# Patient Record
Sex: MALE | Race: WHITE | NOT HISPANIC OR LATINO | Employment: OTHER | URBAN - METROPOLITAN AREA
[De-identification: names, ages, dates, MRNs, and addresses within clinical notes are randomized per-mention and may not be internally consistent; named-entity substitution may affect disease eponyms.]

---

## 2019-12-30 ENCOUNTER — TRANSFERRED RECORDS (OUTPATIENT)
Dept: HEALTH INFORMATION MANAGEMENT | Facility: CLINIC | Age: 64
End: 2019-12-30

## 2020-01-02 ENCOUNTER — TRANSFERRED RECORDS (OUTPATIENT)
Dept: HEALTH INFORMATION MANAGEMENT | Facility: CLINIC | Age: 65
End: 2020-01-02

## 2020-01-09 ENCOUNTER — TRANSFERRED RECORDS (OUTPATIENT)
Dept: HEALTH INFORMATION MANAGEMENT | Facility: CLINIC | Age: 65
End: 2020-01-09

## 2021-07-28 ENCOUNTER — TRANSFERRED RECORDS (OUTPATIENT)
Dept: HEALTH INFORMATION MANAGEMENT | Facility: CLINIC | Age: 66
End: 2021-07-28

## 2022-09-07 ENCOUNTER — TRANSFERRED RECORDS (OUTPATIENT)
Dept: HEALTH INFORMATION MANAGEMENT | Facility: CLINIC | Age: 67
End: 2022-09-07

## 2022-09-28 ENCOUNTER — TRANSFERRED RECORDS (OUTPATIENT)
Dept: HEALTH INFORMATION MANAGEMENT | Facility: CLINIC | Age: 67
End: 2022-09-28

## 2022-09-28 ENCOUNTER — MEDICAL CORRESPONDENCE (OUTPATIENT)
Dept: HEALTH INFORMATION MANAGEMENT | Facility: CLINIC | Age: 67
End: 2022-09-28

## 2022-10-04 ENCOUNTER — TRANSCRIBE ORDERS (OUTPATIENT)
Dept: OTHER | Age: 67
End: 2022-10-04

## 2022-10-04 DIAGNOSIS — M75.121 NONTRAUMATIC COMPLETE TEAR OF RIGHT ROTATOR CUFF: ICD-10-CM

## 2022-10-04 DIAGNOSIS — M75.122 NONTRAUMATIC COMPLETE TEAR OF LEFT ROTATOR CUFF: Primary | ICD-10-CM

## 2022-10-14 NOTE — TELEPHONE ENCOUNTER
Action    Action Taken Per patient, he has had 4 surgeries on RT shoulder, 1 on LFT. All repairs, but can not remember names. States he has a titanium staple in one shoulder, but not sure which. Surgeries ranges from 1985 to around 2006, he thinks. States last one was done at [facility]. They do not have any records that far back. No OP notes from any surgeries.        DIAGNOSIS: rotator cuff tear surgery previously on both shoulders   APPOINTMENT DATE: 10/27/2022   NOTES STATUS DETAILS   OFFICE NOTE from referring provider Internal 10/04/2022 [facility]     OFFICE NOTE from other specialist Care Everywhere 11/20/2019 [facility]    DISCHARGE SUMMARY from hospital N/A    DISCHARGE REPORT from the ER N/A    OPERATIVE REPORT N/A    MEDICATION LIST N/A    EMG (for Spine) N/A    IMPLANT RECORD/STICKER N/A    LABS     CBC/DIFF N/A    CULTURES N/A    INJECTIONS DONE IN RADIOLOGY N/A    MRI Received 01/09/2020 Upper extremity   CT SCAN N/A    XRAYS (IMAGES & REPORTS) Received 01/02/2020 RT shoulder      TUMOR     PATHOLOGY  Slides & report N/A      Images received via disc.

## 2022-10-25 DIAGNOSIS — M25.512 BILATERAL SHOULDER PAIN: Primary | ICD-10-CM

## 2022-10-25 DIAGNOSIS — M25.511 BILATERAL SHOULDER PAIN: Primary | ICD-10-CM

## 2022-10-26 NOTE — PROGRESS NOTES
CHIEF CONCERN: Bilateral shoulder pain    HISTORY OF PRESENT ILLNESS: Patient is a 67-year-old right-hand-dominant male in whom I am seeing today referred by the [facility].  Patient reports that his right shoulder is more symptomatic than the left.  He has had 3 injections/year for perhaps the last 4 years.  Initially his injections are working well but the last 2 injections did not provide much relief at all.  He describes that he does not have much strength and he does have pain regularly.  His pain is worse after more activity.  He and his wife describe that at times he does develop almost a cystlike formation over the top of the right shoulder.  He was seen 9/7/22 at [facility] and received B shoulder injections that date. He was then referred to this clinic.    PAST MEDICAL HISTORY: Reviewed in the EMR and with the patient.    No current outpatient medications on file.        Not on File    SOCIAL HISTORY:    Social History     Socioeconomic History     Marital status:      Spouse name: Not on file     Number of children: Not on file     Years of education: Not on file     Highest education level: Not on file   Occupational History     Not on file   Tobacco Use     Smoking status: Not on file     Smokeless tobacco: Not on file   Substance and Sexual Activity     Alcohol use: Not on file     Drug use: Not on file     Sexual activity: Not on file   Other Topics Concern     Not on file   Social History Narrative     Not on file     Social Determinants of Health     Financial Resource Strain: Not on file   Food Insecurity: Not on file   Transportation Needs: Not on file   Physical Activity: Not on file   Stress: Not on file   Social Connections: Not on file   Intimate Partner Violence: Not on file   Housing Stability: Not on file       FAMILY HISTORY: Reviewed in EMR      REVIEW OF SYSTEMS: Positive for that noted in past medical history and history of present illness and  otherwise reviewed in EMR     PHYSICAL EXAM:    Adult male in no acute distress. Articulates and communicates with normal affect.  Respirations even and unlabored  Focused upper extremity exam: Skin intact. No erythema. Sensation intact all dermatomes into the hand to light touch. EPL, FPL, and Intrinsics intact. Right shoulder active motion is FE to 150 but pure glenohumeral motion is to only 90, ER at side to 10, and IR to back pocket. Left shoulder active motion is FE to 90, ER to 25, and IR to L5.  Motion and pain limit Neer and Torres.  No focal pain on palpation over the AC joint.  Moderate pain on palpation over the long head of the biceps.     IMAGING:  Bilateral shoulder XRs today were reviewed and I agree with the Impression below:  IMPRESSION:      Right shoulder: Moderate glenohumeral osteoarthrosis with partial joint space narrowing and marginal osteophytes. Narrowing of acromiohumeral interval with superior migration of the humeral head, indicating underlying rotator cuff tearing. Mild   degenerative arthritic changes at the acromioclavicular joint. No fracture or bone lesion. Normal joint alignment.     Left shoulder: There are advanced glenohumeral osteoarthrosis with complete joint space narrowing and marginal osteophytes. Narrowing of the acromiohumeral interval with superior migration of the humeral head, indicating underlying rotator cuff tearing.   There has been prior rotator cuff repair, suture anchors remain seated in the greater tuberosity. Moderate degenerative arthritic changes at the acromioclavicular joint. No fracture or bone lesion. Normal joint alignment.    Left shoulder MRI dated 1/9/2020 was reviewed and I agree with the Impression below:      ASSESSMENT:    1.  Bilateral cuff tear arthropathy    PLAN:  I reviewed the physical exam and imaging findings with the patient. We discussed the nature of irreparable rotator cuff tears and arthritis. We discussed reasonable expectations of  reverse TSA function. I advised the patient of the lifetime 10-15# restriction and that this replacement option likely wears differently and may not last as long as standard arthroplasty options. I disclosed my own relationship with industry in this field. He expressed understanding of this discussion. He will be in touch with our office regarding scheduling for the right shoulder.  We will review plan for postop pain medications at time of surgery (Tylenol, opioids, NSAID side effects).    Marisel Moraes MD

## 2022-10-27 ENCOUNTER — OFFICE VISIT (OUTPATIENT)
Dept: ORTHOPEDICS | Facility: CLINIC | Age: 67
End: 2022-10-27
Payer: COMMERCIAL

## 2022-10-27 ENCOUNTER — ANCILLARY PROCEDURE (OUTPATIENT)
Dept: GENERAL RADIOLOGY | Facility: CLINIC | Age: 67
End: 2022-10-27
Attending: ORTHOPAEDIC SURGERY
Payer: COMMERCIAL

## 2022-10-27 ENCOUNTER — PRE VISIT (OUTPATIENT)
Dept: ORTHOPEDICS | Facility: CLINIC | Age: 67
End: 2022-10-27

## 2022-10-27 ENCOUNTER — TELEPHONE (OUTPATIENT)
Dept: ORTHOPEDICS | Facility: CLINIC | Age: 67
End: 2022-10-27

## 2022-10-27 VITALS — HEIGHT: 67 IN | BODY MASS INDEX: 33.59 KG/M2 | WEIGHT: 214 LBS

## 2022-10-27 DIAGNOSIS — M25.512 BILATERAL SHOULDER PAIN: ICD-10-CM

## 2022-10-27 DIAGNOSIS — M75.122 NONTRAUMATIC COMPLETE TEAR OF LEFT ROTATOR CUFF: ICD-10-CM

## 2022-10-27 DIAGNOSIS — M25.511 BILATERAL SHOULDER PAIN: ICD-10-CM

## 2022-10-27 DIAGNOSIS — M75.121 NONTRAUMATIC COMPLETE TEAR OF RIGHT ROTATOR CUFF: ICD-10-CM

## 2022-10-27 PROCEDURE — 99204 OFFICE O/P NEW MOD 45 MIN: CPT | Performed by: ORTHOPAEDIC SURGERY

## 2022-10-27 PROCEDURE — 73030 X-RAY EXAM OF SHOULDER: CPT | Mod: LT | Performed by: RADIOLOGY

## 2022-10-27 RX ORDER — SIMVASTATIN 20 MG
10 TABLET ORAL AT BEDTIME
COMMUNITY

## 2022-10-27 RX ORDER — FLUTICASONE PROPIONATE 50 MCG
SPRAY, SUSPENSION (ML) NASAL
Status: ON HOLD | COMMUNITY
Start: 2022-09-15 | End: 2023-01-17

## 2022-10-27 RX ORDER — LORAZEPAM 0.5 MG/1
TABLET ORAL
COMMUNITY
Start: 2021-12-16

## 2022-10-27 RX ORDER — ZOLPIDEM TARTRATE 10 MG/1
10 TABLET ORAL
COMMUNITY

## 2022-10-27 RX ORDER — PROPRANOLOL HYDROCHLORIDE 80 MG/1
CAPSULE, EXTENDED RELEASE ORAL
COMMUNITY
Start: 2022-10-25

## 2022-10-27 RX ORDER — SERTRALINE HYDROCHLORIDE 100 MG/1
100 TABLET, FILM COATED ORAL DAILY
COMMUNITY
Start: 2022-08-22

## 2022-10-27 RX ORDER — ASPIRIN 81 MG/1
81 TABLET, CHEWABLE ORAL DAILY
Status: ON HOLD | COMMUNITY
End: 2023-01-18

## 2022-10-27 ASSESSMENT — PATIENT HEALTH QUESTIONNAIRE - PHQ9: SUM OF ALL RESPONSES TO PHQ QUESTIONS 1-9: 14

## 2022-10-27 NOTE — LETTER
10/27/2022         RE: Davey Burciaga  [address] [facility] [address]        Dear Colleague,    Thank you for referring your patient, Davey Burciaga, to the [facility]. Please see a copy of my visit note below.    CHIEF CONCERN: Bilateral shoulder pain    HISTORY OF PRESENT ILLNESS: Patient is a 67-year-old right-hand-dominant male in whom I am seeing today referred by the [facility].  Patient reports that his right shoulder is more symptomatic than the left.  He has had 3 injections/year for perhaps the last 4 years.  Initially his injections are working well but the last 2 injections did not provide much relief at all.  He describes that he does not have much strength and he does have pain regularly.  His pain is worse after more activity.  He and his wife describe that at times he does develop almost a cystlike formation over the top of the right shoulder.  He was seen 9/7/22 at [facility] and received B shoulder injections that date. He was then referred to this clinic.    PAST MEDICAL HISTORY: Reviewed in the EMR and with the patient.    No current outpatient medications on file.        Not on File    SOCIAL HISTORY:    Social History     Socioeconomic History     Marital status:      Spouse name: Not on file     Number of children: Not on file     Years of education: Not on file     Highest education level: Not on file   Occupational History     Not on file   Tobacco Use     Smoking status: Not on file     Smokeless tobacco: Not on file   Substance and Sexual Activity     Alcohol use: Not on file     Drug use: Not on file     Sexual activity: Not on file   Other Topics Concern     Not on file   Social History Narrative     Not on file     Social Determinants of Health     Financial Resource Strain: Not on file   Food Insecurity: Not on file   Transportation Needs: Not on file   Physical Activity: Not on file   Stress: Not on file    Social Connections: Not on file   Intimate Partner Violence: Not on file   Housing Stability: Not on file       FAMILY HISTORY: Reviewed in EMR      REVIEW OF SYSTEMS: Positive for that noted in past medical history and history of present illness and otherwise reviewed in EMR     PHYSICAL EXAM:    Adult male in no acute distress. Articulates and communicates with normal affect.  Respirations even and unlabored  Focused upper extremity exam: Skin intact. No erythema. Sensation intact all dermatomes into the hand to light touch. EPL, FPL, and Intrinsics intact. Right shoulder active motion is FE to 150 but pure glenohumeral motion is to only 90, ER at side to 10, and IR to back pocket. Left shoulder active motion is FE to 90, ER to 25, and IR to L5.  Motion and pain limit Neer and Torres.  No focal pain on palpation over the AC joint.  Moderate pain on palpation over the long head of the biceps.     IMAGING:  Bilateral shoulder XRs today were reviewed and I agree with the Impression below:  IMPRESSION:      Right shoulder: Moderate glenohumeral osteoarthrosis with partial joint space narrowing and marginal osteophytes. Narrowing of acromiohumeral interval with superior migration of the humeral head, indicating underlying rotator cuff tearing. Mild   degenerative arthritic changes at the acromioclavicular joint. No fracture or bone lesion. Normal joint alignment.     Left shoulder: There are advanced glenohumeral osteoarthrosis with complete joint space narrowing and marginal osteophytes. Narrowing of the acromiohumeral interval with superior migration of the humeral head, indicating underlying rotator cuff tearing.   There has been prior rotator cuff repair, suture anchors remain seated in the greater tuberosity. Moderate degenerative arthritic changes at the acromioclavicular joint. No fracture or bone lesion. Normal joint alignment.    Left shoulder MRI dated 1/9/2020 was reviewed and I agree with the Impression  below:      ASSESSMENT:    1.  Bilateral cuff tear arthropathy    PLAN:  I reviewed the physical exam and imaging findings with the patient. We discussed the nature of irreparable rotator cuff tears and arthritis. We discussed reasonable expectations of reverse TSA function. I advised the patient of the lifetime 10-15# restriction and that this replacement option likely wears differently and may not last as long as standard arthroplasty options. I disclosed my own relationship with industry in this field. He expressed understanding of this discussion. He will be in touch with our office regarding scheduling for the right shoulder.  We will review plan for postop pain medications at time of surgery (Tylenol, opioids, NSAID side effects).    Marisel Moraes MD        Again, thank you for allowing me to participate in the care of your patient.        Sincerely,        Marisel Moraes MD

## 2022-10-27 NOTE — NURSING NOTE
Depression Response    Patient completed the PHQ-9 assessment for depression and scored >9? Yes  Question 9 on the PHQ-9 was positive for suicidality? No  Does patient have current mental health provider? Yes - patient is seeing GP for anxiety and depression and is taking medication for depression.     Is this a virtual visit? No    I personally notified the following: visit provider

## 2022-10-27 NOTE — TELEPHONE ENCOUNTER
Not sure if Pt needs a CT 3-4 wks prior to surgery, but checking with Dr. Moraes.     Procedure: Right reverse total shoulder arthroplasty  Facility: [facility]  Length: 150 minutes  Anesthesia: Choice, Interscalene Block  Post-op appointments needed: 2 weeks provider only, 6 weeks with provider only.  Surgery packet/instructions given to patient?  Yes     Alejandro Perez RN

## 2022-10-27 NOTE — NURSING NOTE
"Reason For Visit:   Chief Complaint   Patient presents with     Consult For     Bilateral shoulder pain       PCP: Kervin Ta  Ref: Dr. Mcknight    ?  No  Occupation Retired.  Currently working? No.  Work status?  Retired.  Date of injury: Gradual onset  Type of injury: No specific injury.  Date of surgery: 1985 - right rotator cuff repair; 1994 - left rotator cuff repair 2000 - right rotator cuff repair 2004- right rotator cuff repair; 2010 - right shoulder rotator cuff repairs;   Smoker: No  Request smoking cessation information: No    Right hand dominant    SANE score  Affected shoulder: Bilateral (Right>left)  Right shoulder SANE: 60  Left shoulder SANE: 70    Ht 1.702 m (5' 7\")   Wt 97.1 kg (214 lb)   BMI 33.52 kg/m      Margi Yin ATC  "

## 2022-10-28 NOTE — TELEPHONE ENCOUNTER
Date Scheduled: 1-17-23  Facility: [facility]  Surgeon: Dr. Moraes   Post-op appointment scheduled: 6 week post op appointment scheduled.   2 week planned to be with a local orthopedic surgeon     scheduled?: No  Surgery packet/instructions confirmed received?  Yes  Special Considerations:   Marry Phan, Surgery Scheduling Coordinator

## 2022-12-13 NOTE — TELEPHONE ENCOUNTER
Received a call from the patient asking if he is supposed to have a CT scan prior to surgery with Dr. Moraes. I told him that I wasn't sure as there weren't any orders in the chart. He asked for a call back to confirm as he doesn't want to miss something that he is supposed to do.     He also asked if he can get something for the pain as the aspirin is not working anymore for pain control. I told him that I would send a message to Dr. Moraes's team and we would be back in touch. He is asking for a return call on his cell phone.    Marry Phan, Surgery Scheduling Coordinator

## 2022-12-16 NOTE — TELEPHONE ENCOUNTER
Received a call from the patient that he needs to have oral surgery done, and the clinic is wanting to know how far in advance or after shoulder surgery is he able to have it done. They tentatively have it scheduled for 1/30/23 which is 13 days post shoulder surgery. He doesn't know if he needs to reschedule his shoulder surgery. He would like to talk with someone on Dr. Moraes's team to help him advise on what to do.    Marry Phan, Surgery Scheduling Coordinator

## 2023-01-04 ENCOUNTER — TRANSFERRED RECORDS (OUTPATIENT)
Dept: MULTI SPECIALTY CLINIC | Facility: CLINIC | Age: 68
End: 2023-01-04
Payer: COMMERCIAL

## 2023-01-04 LAB
CREATININE (EXTERNAL): 0.9 MG/DL (ref 0.66–1.25)
GFR ESTIMATED (EXTERNAL): >90 ML/MIN/1.73M2
GLUCOSE (EXTERNAL): 102 MG/DL (ref 74–106)
POTASSIUM (EXTERNAL): 4.1 MEQ/L (ref 3.5–5.1)

## 2023-01-16 ENCOUNTER — ANESTHESIA EVENT (OUTPATIENT)
Dept: SURGERY | Facility: CLINIC | Age: 68
End: 2023-01-16
Payer: COMMERCIAL

## 2023-01-16 NOTE — ANESTHESIA PREPROCEDURE EVALUATION
Anesthesia Pre-Procedure Evaluation    Patient: Davey Burciaga   MRN: [id number] : 1955        Procedure : Procedure(s):  ARTHROPLASTY, RIGHT SHOULDER, TOTAL, REVERSE          No past medical history on file.   No past surgical history on file.   No Known Allergies   Social History     Tobacco Use     Smoking status: Not on file     Smokeless tobacco: Not on file   Substance Use Topics     Alcohol use: Not on file      Wt Readings from Last 1 Encounters:   10/27/22 97.1 kg (214 lb)        Anesthesia Evaluation   Pt has had prior anesthetic.     No history of anesthetic complications       ROS/MED HX  ENT/Pulmonary:     (+) sleep apnea, mild, doesn't use CPAP,     Neurologic:  - neg neurologic ROS     Cardiovascular:     (+) --CAD (in ) ---Previous cardiac testing     METS/Exercise Tolerance: >4 METS    Hematologic:  - neg hematologic  ROS     Musculoskeletal: Comment: Nontraumatic complete tear of right rotator cuff       GI/Hepatic:  - neg GI/hepatic ROS     Renal/Genitourinary:  - neg Renal ROS     Endo:  - neg endo ROS     Psychiatric/Substance Use:     (+) psychiatric history anxiety and depression     Infectious Disease:  - neg infectious disease ROS     Malignancy:  - neg malignancy ROS     Other:  - neg other ROS          Physical Exam    Airway        Mallampati: II   TM distance: > 3 FB   Neck ROM: full   Mouth opening: > 3 cm    Respiratory Devices and Support         Dental  no notable dental history         Cardiovascular   cardiovascular exam normal          Pulmonary   pulmonary exam normal                OUTSIDE LABS:  CBC: No results found for: WBC, HGB, HCT, PLT  BMP: No results found for: NA, POTASSIUM, CHLORIDE, CO2, BUN, CR, GLC  COAGS: No results found for: PTT, INR, FIBR  POC: No results found for: BGM, HCG, HCGS  HEPATIC: No results found for: ALBUMIN, PROTTOTAL, ALT, AST, GGT, ALKPHOS, BILITOTAL, BILIDIRECT, PAUL  OTHER: No results found for: PH, LACT, A1C, BUSTER, PHOS, MAG,  LIPASE, AMYLASE, TSH, T4, T3, CRP, SED    Anesthesia Plan    ASA Status:  2   NPO Status:  NPO Appropriate    Anesthesia Type: General.     - Airway: LMA   Induction: Intravenous.   Maintenance: Balanced.   Techniques and Equipment:     - Lines/Monitors: 2nd IV     Consents    Anesthesia Plan(s) and associated risks, benefits, and realistic alternatives discussed. Questions answered and patient/representative(s) expressed understanding.     - Discussed: Risks, Benefits and Alternatives for BOTH SEDATION and the PROCEDURE were discussed     - Discussed with:  Patient      - Extended Intubation/Ventilatory Support Discussed: No.      - Patient is DNR/DNI Status: No    Use of blood products discussed: Yes.     - Discussed with: Patient.     Postoperative Care    Pain management: IV analgesics, Multi-modal analgesia, Peripheral nerve block (Single Shot).   PONV prophylaxis: Ondansetron (or other 5HT-3), Dexamethasone or Solumedrol, Background Propofol Infusion     Comments:           H&P reviewed: Unable to attach VIRTUAL H&P to encounter due to EHR limitations. Appropriate H&P reviewed. The physical exam performed by anesthesia during this surgical encounter serves as the physical portion of that virtual H&P.  Any significant changes noted within this preop evaluation.          Patricia Leonard MD

## 2023-01-17 ENCOUNTER — APPOINTMENT (OUTPATIENT)
Dept: GENERAL RADIOLOGY | Facility: CLINIC | Age: 68
End: 2023-01-17
Attending: ORTHOPAEDIC SURGERY
Payer: COMMERCIAL

## 2023-01-17 ENCOUNTER — DOCUMENTATION ONLY (OUTPATIENT)
Dept: OTHER | Facility: CLINIC | Age: 68
End: 2023-01-17
Payer: COMMERCIAL

## 2023-01-17 ENCOUNTER — HOSPITAL ENCOUNTER (OUTPATIENT)
Facility: CLINIC | Age: 68
Discharge: HOME HEALTH | End: 2023-01-18
Attending: ORTHOPAEDIC SURGERY | Admitting: ORTHOPAEDIC SURGERY
Payer: COMMERCIAL

## 2023-01-17 ENCOUNTER — ANESTHESIA (OUTPATIENT)
Dept: SURGERY | Facility: CLINIC | Age: 68
End: 2023-01-17
Payer: COMMERCIAL

## 2023-01-17 DIAGNOSIS — M19.011 ARTHROSIS OF RIGHT SHOULDER: Primary | ICD-10-CM

## 2023-01-17 PROCEDURE — 999N000141 HC STATISTIC PRE-PROCEDURE NURSING ASSESSMENT: Performed by: ORTHOPAEDIC SURGERY

## 2023-01-17 PROCEDURE — 250N000009 HC RX 250: Performed by: ORTHOPAEDIC SURGERY

## 2023-01-17 PROCEDURE — 250N000013 HC RX MED GY IP 250 OP 250 PS 637: Performed by: INTERNAL MEDICINE

## 2023-01-17 PROCEDURE — 999N000065 XR SHOULDER RIGHT PORT G/E 2 VIEWS: Mod: RT

## 2023-01-17 PROCEDURE — 250N000013 HC RX MED GY IP 250 OP 250 PS 637: Performed by: ORTHOPAEDIC SURGERY

## 2023-01-17 PROCEDURE — 250N000011 HC RX IP 250 OP 636: Performed by: NURSE ANESTHETIST, CERTIFIED REGISTERED

## 2023-01-17 PROCEDURE — 250N000011 HC RX IP 250 OP 636: Performed by: STUDENT IN AN ORGANIZED HEALTH CARE EDUCATION/TRAINING PROGRAM

## 2023-01-17 PROCEDURE — 250N000011 HC RX IP 250 OP 636: Performed by: ORTHOPAEDIC SURGERY

## 2023-01-17 PROCEDURE — 710N000009 HC RECOVERY PHASE 1, LEVEL 1, PER MIN: Performed by: ORTHOPAEDIC SURGERY

## 2023-01-17 PROCEDURE — 258N000003 HC RX IP 258 OP 636: Performed by: NURSE ANESTHETIST, CERTIFIED REGISTERED

## 2023-01-17 PROCEDURE — 272N000001 HC OR GENERAL SUPPLY STERILE: Performed by: ORTHOPAEDIC SURGERY

## 2023-01-17 PROCEDURE — 250N000009 HC RX 250: Performed by: NURSE ANESTHETIST, CERTIFIED REGISTERED

## 2023-01-17 PROCEDURE — C1776 JOINT DEVICE (IMPLANTABLE): HCPCS | Performed by: ORTHOPAEDIC SURGERY

## 2023-01-17 PROCEDURE — C1713 ANCHOR/SCREW BN/BN,TIS/BN: HCPCS | Performed by: ORTHOPAEDIC SURGERY

## 2023-01-17 PROCEDURE — C9290 INJ, BUPIVACAINE LIPOSOME: HCPCS | Performed by: STUDENT IN AN ORGANIZED HEALTH CARE EDUCATION/TRAINING PROGRAM

## 2023-01-17 PROCEDURE — 370N000017 HC ANESTHESIA TECHNICAL FEE, PER MIN: Performed by: ORTHOPAEDIC SURGERY

## 2023-01-17 PROCEDURE — 278N000051 HC OR IMPLANT GENERAL: Performed by: ORTHOPAEDIC SURGERY

## 2023-01-17 PROCEDURE — 250N000011 HC RX IP 250 OP 636: Performed by: ANESTHESIOLOGY

## 2023-01-17 PROCEDURE — 250N000025 HC SEVOFLURANE, PER MIN: Performed by: ORTHOPAEDIC SURGERY

## 2023-01-17 PROCEDURE — 23472 RECONSTRUCT SHOULDER JOINT: CPT | Mod: RT | Performed by: ORTHOPAEDIC SURGERY

## 2023-01-17 PROCEDURE — 360N000077 HC SURGERY LEVEL 4, PER MIN: Performed by: ORTHOPAEDIC SURGERY

## 2023-01-17 PROCEDURE — 99204 OFFICE O/P NEW MOD 45 MIN: CPT | Performed by: INTERNAL MEDICINE

## 2023-01-17 PROCEDURE — 271N000001 HC OR GENERAL SUPPLY NON-STERILE: Performed by: ORTHOPAEDIC SURGERY

## 2023-01-17 DEVICE — IMPLANTABLE DEVICE
Type: IMPLANTABLE DEVICE | Site: SHOULDER | Status: FUNCTIONAL
Brand: COMPREHENSIVE® REVERSE SHOULDER

## 2023-01-17 DEVICE — IMPLANTABLE DEVICE
Type: IMPLANTABLE DEVICE | Site: SHOULDER | Status: FUNCTIONAL
Brand: COMPREHENSIVE® PROLONG®

## 2023-01-17 DEVICE — IMPLANTABLE DEVICE
Type: IMPLANTABLE DEVICE | Site: SHOULDER | Status: FUNCTIONAL
Brand: COMPREHENSIVE® SHOULDER SYSTEM

## 2023-01-17 DEVICE — IMP HUMERAL TRAY ZIM RVRS SHLDR STD 110031399: Type: IMPLANTABLE DEVICE | Site: SHOULDER | Status: FUNCTIONAL

## 2023-01-17 DEVICE — IMPLANTABLE DEVICE
Type: IMPLANTABLE DEVICE | Site: SHOULDER | Status: FUNCTIONAL
Brand: COMPREHENSIVE REVERSE SHOULDER

## 2023-01-17 RX ORDER — HYDROMORPHONE HYDROCHLORIDE 1 MG/ML
0.4 INJECTION, SOLUTION INTRAMUSCULAR; INTRAVENOUS; SUBCUTANEOUS EVERY 5 MIN PRN
Status: DISCONTINUED | OUTPATIENT
Start: 2023-01-17 | End: 2023-01-17 | Stop reason: HOSPADM

## 2023-01-17 RX ORDER — GLYCOPYRROLATE 0.2 MG/ML
INJECTION, SOLUTION INTRAMUSCULAR; INTRAVENOUS PRN
Status: DISCONTINUED | OUTPATIENT
Start: 2023-01-17 | End: 2023-01-17

## 2023-01-17 RX ORDER — ONDANSETRON 4 MG/1
4 TABLET, ORALLY DISINTEGRATING ORAL EVERY 6 HOURS PRN
Status: DISCONTINUED | OUTPATIENT
Start: 2023-01-17 | End: 2023-01-18 | Stop reason: HOSPADM

## 2023-01-17 RX ORDER — FLUMAZENIL 0.1 MG/ML
0.2 INJECTION, SOLUTION INTRAVENOUS
Status: DISCONTINUED | OUTPATIENT
Start: 2023-01-17 | End: 2023-01-17 | Stop reason: HOSPADM

## 2023-01-17 RX ORDER — ASPIRIN 81 MG/1
162 TABLET ORAL DAILY
Status: DISCONTINUED | OUTPATIENT
Start: 2023-01-18 | End: 2023-01-18 | Stop reason: HOSPADM

## 2023-01-17 RX ORDER — OXYCODONE HYDROCHLORIDE 10 MG/1
10 TABLET ORAL EVERY 4 HOURS PRN
Status: DISCONTINUED | OUTPATIENT
Start: 2023-01-17 | End: 2023-01-18 | Stop reason: HOSPADM

## 2023-01-17 RX ORDER — FENTANYL CITRATE 50 UG/ML
50 INJECTION, SOLUTION INTRAMUSCULAR; INTRAVENOUS EVERY 5 MIN PRN
Status: DISCONTINUED | OUTPATIENT
Start: 2023-01-17 | End: 2023-01-17 | Stop reason: HOSPADM

## 2023-01-17 RX ORDER — PROCHLORPERAZINE MALEATE 5 MG
5 TABLET ORAL EVERY 6 HOURS PRN
Status: DISCONTINUED | OUTPATIENT
Start: 2023-01-17 | End: 2023-01-18 | Stop reason: HOSPADM

## 2023-01-17 RX ORDER — IBUPROFEN 600 MG/1
600 TABLET, FILM COATED ORAL EVERY 6 HOURS PRN
Status: DISCONTINUED | OUTPATIENT
Start: 2023-01-17 | End: 2023-01-18 | Stop reason: HOSPADM

## 2023-01-17 RX ORDER — NALOXONE HYDROCHLORIDE 0.4 MG/ML
0.2 INJECTION, SOLUTION INTRAMUSCULAR; INTRAVENOUS; SUBCUTANEOUS
Status: DISCONTINUED | OUTPATIENT
Start: 2023-01-17 | End: 2023-01-18 | Stop reason: HOSPADM

## 2023-01-17 RX ORDER — SODIUM CHLORIDE, SODIUM LACTATE, POTASSIUM CHLORIDE, CALCIUM CHLORIDE 600; 310; 30; 20 MG/100ML; MG/100ML; MG/100ML; MG/100ML
INJECTION, SOLUTION INTRAVENOUS CONTINUOUS PRN
Status: DISCONTINUED | OUTPATIENT
Start: 2023-01-17 | End: 2023-01-17

## 2023-01-17 RX ORDER — NALOXONE HYDROCHLORIDE 0.4 MG/ML
0.2 INJECTION, SOLUTION INTRAMUSCULAR; INTRAVENOUS; SUBCUTANEOUS
Status: DISCONTINUED | OUTPATIENT
Start: 2023-01-17 | End: 2023-01-17

## 2023-01-17 RX ORDER — FENTANYL CITRATE 50 UG/ML
25 INJECTION, SOLUTION INTRAMUSCULAR; INTRAVENOUS
Status: DISCONTINUED | OUTPATIENT
Start: 2023-01-17 | End: 2023-01-17 | Stop reason: HOSPADM

## 2023-01-17 RX ORDER — BUPIVACAINE HYDROCHLORIDE 2.5 MG/ML
INJECTION, SOLUTION EPIDURAL; INFILTRATION; INTRACAUDAL
Status: COMPLETED | OUTPATIENT
Start: 2023-01-17 | End: 2023-01-17

## 2023-01-17 RX ORDER — HYDROMORPHONE HYDROCHLORIDE 1 MG/ML
0.2 INJECTION, SOLUTION INTRAMUSCULAR; INTRAVENOUS; SUBCUTANEOUS
Status: DISCONTINUED | OUTPATIENT
Start: 2023-01-17 | End: 2023-01-18 | Stop reason: HOSPADM

## 2023-01-17 RX ORDER — ONDANSETRON 4 MG/1
4 TABLET, ORALLY DISINTEGRATING ORAL EVERY 30 MIN PRN
Status: DISCONTINUED | OUTPATIENT
Start: 2023-01-17 | End: 2023-01-17 | Stop reason: HOSPADM

## 2023-01-17 RX ORDER — SODIUM CHLORIDE, SODIUM LACTATE, POTASSIUM CHLORIDE, CALCIUM CHLORIDE 600; 310; 30; 20 MG/100ML; MG/100ML; MG/100ML; MG/100ML
INJECTION, SOLUTION INTRAVENOUS CONTINUOUS
Status: DISCONTINUED | OUTPATIENT
Start: 2023-01-17 | End: 2023-01-17 | Stop reason: HOSPADM

## 2023-01-17 RX ORDER — BISACODYL 10 MG
10 SUPPOSITORY, RECTAL RECTAL DAILY PRN
Status: DISCONTINUED | OUTPATIENT
Start: 2023-01-17 | End: 2023-01-18 | Stop reason: HOSPADM

## 2023-01-17 RX ORDER — NALOXONE HYDROCHLORIDE 0.4 MG/ML
0.4 INJECTION, SOLUTION INTRAMUSCULAR; INTRAVENOUS; SUBCUTANEOUS
Status: DISCONTINUED | OUTPATIENT
Start: 2023-01-17 | End: 2023-01-17

## 2023-01-17 RX ORDER — NALOXONE HYDROCHLORIDE 0.4 MG/ML
0.4 INJECTION, SOLUTION INTRAMUSCULAR; INTRAVENOUS; SUBCUTANEOUS
Status: DISCONTINUED | OUTPATIENT
Start: 2023-01-17 | End: 2023-01-18 | Stop reason: HOSPADM

## 2023-01-17 RX ORDER — OXYCODONE HYDROCHLORIDE 5 MG/1
5 TABLET ORAL EVERY 4 HOURS PRN
Status: DISCONTINUED | OUTPATIENT
Start: 2023-01-17 | End: 2023-01-18 | Stop reason: HOSPADM

## 2023-01-17 RX ORDER — SERTRALINE HYDROCHLORIDE 100 MG/1
100 TABLET, FILM COATED ORAL AT BEDTIME
Status: DISCONTINUED | OUTPATIENT
Start: 2023-01-18 | End: 2023-01-18 | Stop reason: HOSPADM

## 2023-01-17 RX ORDER — FENTANYL CITRATE 50 UG/ML
25-50 INJECTION, SOLUTION INTRAMUSCULAR; INTRAVENOUS
Status: DISCONTINUED | OUTPATIENT
Start: 2023-01-17 | End: 2023-01-17 | Stop reason: HOSPADM

## 2023-01-17 RX ORDER — MEPERIDINE HYDROCHLORIDE 25 MG/ML
12.5 INJECTION INTRAMUSCULAR; INTRAVENOUS; SUBCUTANEOUS
Status: DISCONTINUED | OUTPATIENT
Start: 2023-01-17 | End: 2023-01-17 | Stop reason: HOSPADM

## 2023-01-17 RX ORDER — PROPOFOL 10 MG/ML
INJECTION, EMULSION INTRAVENOUS PRN
Status: DISCONTINUED | OUTPATIENT
Start: 2023-01-17 | End: 2023-01-17

## 2023-01-17 RX ORDER — ONDANSETRON 2 MG/ML
4 INJECTION INTRAMUSCULAR; INTRAVENOUS EVERY 6 HOURS PRN
Status: DISCONTINUED | OUTPATIENT
Start: 2023-01-17 | End: 2023-01-18 | Stop reason: HOSPADM

## 2023-01-17 RX ORDER — HYDROMORPHONE HYDROCHLORIDE 1 MG/ML
0.2 INJECTION, SOLUTION INTRAMUSCULAR; INTRAVENOUS; SUBCUTANEOUS EVERY 5 MIN PRN
Status: DISCONTINUED | OUTPATIENT
Start: 2023-01-17 | End: 2023-01-17 | Stop reason: HOSPADM

## 2023-01-17 RX ORDER — LIDOCAINE HYDROCHLORIDE 20 MG/ML
INJECTION, SOLUTION INFILTRATION; PERINEURAL PRN
Status: DISCONTINUED | OUTPATIENT
Start: 2023-01-17 | End: 2023-01-17

## 2023-01-17 RX ORDER — HYDROMORPHONE HYDROCHLORIDE 1 MG/ML
0.4 INJECTION, SOLUTION INTRAMUSCULAR; INTRAVENOUS; SUBCUTANEOUS
Status: DISCONTINUED | OUTPATIENT
Start: 2023-01-17 | End: 2023-01-18 | Stop reason: HOSPADM

## 2023-01-17 RX ORDER — AMOXICILLIN 250 MG
1 CAPSULE ORAL 2 TIMES DAILY
Status: DISCONTINUED | OUTPATIENT
Start: 2023-01-17 | End: 2023-01-18 | Stop reason: HOSPADM

## 2023-01-17 RX ORDER — EPHEDRINE SULFATE 50 MG/ML
INJECTION, SOLUTION INTRAMUSCULAR; INTRAVENOUS; SUBCUTANEOUS PRN
Status: DISCONTINUED | OUTPATIENT
Start: 2023-01-17 | End: 2023-01-17

## 2023-01-17 RX ORDER — SODIUM CHLORIDE, SODIUM LACTATE, POTASSIUM CHLORIDE, CALCIUM CHLORIDE 600; 310; 30; 20 MG/100ML; MG/100ML; MG/100ML; MG/100ML
INJECTION, SOLUTION INTRAVENOUS CONTINUOUS
Status: DISCONTINUED | OUTPATIENT
Start: 2023-01-17 | End: 2023-01-18 | Stop reason: HOSPADM

## 2023-01-17 RX ORDER — ONDANSETRON 2 MG/ML
4 INJECTION INTRAMUSCULAR; INTRAVENOUS EVERY 30 MIN PRN
Status: DISCONTINUED | OUTPATIENT
Start: 2023-01-17 | End: 2023-01-17 | Stop reason: HOSPADM

## 2023-01-17 RX ORDER — VITAMIN B COMPLEX
1 TABLET ORAL DAILY
COMMUNITY

## 2023-01-17 RX ORDER — ZOLPIDEM TARTRATE 10 MG/1
10 TABLET ORAL
Status: DISCONTINUED | OUTPATIENT
Start: 2023-01-17 | End: 2023-01-18 | Stop reason: HOSPADM

## 2023-01-17 RX ORDER — FENTANYL CITRATE 50 UG/ML
25 INJECTION, SOLUTION INTRAMUSCULAR; INTRAVENOUS EVERY 5 MIN PRN
Status: DISCONTINUED | OUTPATIENT
Start: 2023-01-17 | End: 2023-01-17 | Stop reason: HOSPADM

## 2023-01-17 RX ORDER — MAGNESIUM HYDROXIDE 1200 MG/15ML
LIQUID ORAL PRN
Status: DISCONTINUED | OUTPATIENT
Start: 2023-01-17 | End: 2023-01-17 | Stop reason: HOSPADM

## 2023-01-17 RX ORDER — ACETAMINOPHEN 325 MG/1
975 TABLET ORAL EVERY 8 HOURS
Status: DISCONTINUED | OUTPATIENT
Start: 2023-01-17 | End: 2023-01-18 | Stop reason: HOSPADM

## 2023-01-17 RX ORDER — VANCOMYCIN HYDROCHLORIDE 1 G/20ML
INJECTION, POWDER, LYOPHILIZED, FOR SOLUTION INTRAVENOUS PRN
Status: DISCONTINUED | OUTPATIENT
Start: 2023-01-17 | End: 2023-01-17 | Stop reason: HOSPADM

## 2023-01-17 RX ORDER — ACETAMINOPHEN 325 MG/1
650 TABLET ORAL EVERY 4 HOURS PRN
Status: DISCONTINUED | OUTPATIENT
Start: 2023-01-20 | End: 2023-01-18 | Stop reason: HOSPADM

## 2023-01-17 RX ORDER — DEXAMETHASONE SODIUM PHOSPHATE 4 MG/ML
INJECTION, SOLUTION INTRA-ARTICULAR; INTRALESIONAL; INTRAMUSCULAR; INTRAVENOUS; SOFT TISSUE PRN
Status: DISCONTINUED | OUTPATIENT
Start: 2023-01-17 | End: 2023-01-17

## 2023-01-17 RX ORDER — FENTANYL CITRATE 50 UG/ML
INJECTION, SOLUTION INTRAMUSCULAR; INTRAVENOUS PRN
Status: DISCONTINUED | OUTPATIENT
Start: 2023-01-17 | End: 2023-01-17

## 2023-01-17 RX ORDER — CEFAZOLIN SODIUM 2 G/100ML
2 INJECTION, SOLUTION INTRAVENOUS EVERY 8 HOURS
Status: COMPLETED | OUTPATIENT
Start: 2023-01-17 | End: 2023-01-18

## 2023-01-17 RX ORDER — SIMVASTATIN 10 MG
10 TABLET ORAL AT BEDTIME
Status: DISCONTINUED | OUTPATIENT
Start: 2023-01-17 | End: 2023-01-18 | Stop reason: HOSPADM

## 2023-01-17 RX ORDER — POLYETHYLENE GLYCOL 3350 17 G/17G
17 POWDER, FOR SOLUTION ORAL DAILY
Status: DISCONTINUED | OUTPATIENT
Start: 2023-01-18 | End: 2023-01-18 | Stop reason: HOSPADM

## 2023-01-17 RX ORDER — TRANEXAMIC ACID 650 MG/1
1950 TABLET ORAL ONCE
Status: COMPLETED | OUTPATIENT
Start: 2023-01-17 | End: 2023-01-17

## 2023-01-17 RX ORDER — CEFAZOLIN SODIUM/WATER 2 G/20 ML
2 SYRINGE (ML) INTRAVENOUS
Status: COMPLETED | OUTPATIENT
Start: 2023-01-17 | End: 2023-01-17

## 2023-01-17 RX ORDER — ONDANSETRON 2 MG/ML
INJECTION INTRAMUSCULAR; INTRAVENOUS PRN
Status: DISCONTINUED | OUTPATIENT
Start: 2023-01-17 | End: 2023-01-17

## 2023-01-17 RX ORDER — LIDOCAINE 40 MG/G
CREAM TOPICAL
Status: DISCONTINUED | OUTPATIENT
Start: 2023-01-17 | End: 2023-01-18 | Stop reason: HOSPADM

## 2023-01-17 RX ORDER — CEFAZOLIN SODIUM/WATER 2 G/20 ML
2 SYRINGE (ML) INTRAVENOUS SEE ADMIN INSTRUCTIONS
Status: DISCONTINUED | OUTPATIENT
Start: 2023-01-17 | End: 2023-01-17 | Stop reason: HOSPADM

## 2023-01-17 RX ADMIN — CEFAZOLIN SODIUM 2 G: 2 INJECTION, SOLUTION INTRAVENOUS at 19:14

## 2023-01-17 RX ADMIN — Medication 10 MG: at 12:22

## 2023-01-17 RX ADMIN — SODIUM CHLORIDE, POTASSIUM CHLORIDE, SODIUM LACTATE AND CALCIUM CHLORIDE: 600; 310; 30; 20 INJECTION, SOLUTION INTRAVENOUS at 13:27

## 2023-01-17 RX ADMIN — BUPIVACAINE HYDROCHLORIDE 10 ML: 2.5 INJECTION, SOLUTION EPIDURAL; INFILTRATION; INTRACAUDAL at 10:54

## 2023-01-17 RX ADMIN — PROPOFOL 50 MG: 10 INJECTION, EMULSION INTRAVENOUS at 11:31

## 2023-01-17 RX ADMIN — SUGAMMADEX 200 MG: 100 INJECTION, SOLUTION INTRAVENOUS at 14:13

## 2023-01-17 RX ADMIN — Medication 2 G: at 11:46

## 2023-01-17 RX ADMIN — LIDOCAINE HYDROCHLORIDE 100 MG: 20 INJECTION, SOLUTION INFILTRATION; PERINEURAL at 11:27

## 2023-01-17 RX ADMIN — PHENYLEPHRINE HYDROCHLORIDE 100 MCG: 10 INJECTION INTRAVENOUS at 11:29

## 2023-01-17 RX ADMIN — SIMVASTATIN 10 MG: 10 TABLET, FILM COATED ORAL at 22:39

## 2023-01-17 RX ADMIN — GLYCOPYRROLATE 0.2 MG: 0.2 INJECTION, SOLUTION INTRAMUSCULAR; INTRAVENOUS at 12:13

## 2023-01-17 RX ADMIN — BUPIVACAINE 10 ML: 13.3 INJECTION, SUSPENSION, LIPOSOMAL INFILTRATION at 10:54

## 2023-01-17 RX ADMIN — ACETAMINOPHEN 975 MG: 325 TABLET, FILM COATED ORAL at 15:37

## 2023-01-17 RX ADMIN — TRANEXAMIC ACID 1950 MG: 650 TABLET ORAL at 09:31

## 2023-01-17 RX ADMIN — Medication 50 MG: at 11:31

## 2023-01-17 RX ADMIN — FENTANYL CITRATE 50 MCG: 50 INJECTION, SOLUTION INTRAMUSCULAR; INTRAVENOUS at 14:09

## 2023-01-17 RX ADMIN — DEXAMETHASONE SODIUM PHOSPHATE 6 MG: 4 INJECTION, SOLUTION INTRA-ARTICULAR; INTRALESIONAL; INTRAMUSCULAR; INTRAVENOUS; SOFT TISSUE at 11:56

## 2023-01-17 RX ADMIN — PROPOFOL 80 MG: 10 INJECTION, EMULSION INTRAVENOUS at 11:29

## 2023-01-17 RX ADMIN — SODIUM CHLORIDE, POTASSIUM CHLORIDE, SODIUM LACTATE AND CALCIUM CHLORIDE: 600; 310; 30; 20 INJECTION, SOLUTION INTRAVENOUS at 11:14

## 2023-01-17 RX ADMIN — PHENYLEPHRINE HYDROCHLORIDE 0.3 MCG/KG/MIN: 10 INJECTION INTRAVENOUS at 11:45

## 2023-01-17 RX ADMIN — PROPOFOL 50 MG: 10 INJECTION, EMULSION INTRAVENOUS at 11:32

## 2023-01-17 RX ADMIN — PHENYLEPHRINE HYDROCHLORIDE 100 MCG: 10 INJECTION INTRAVENOUS at 11:40

## 2023-01-17 RX ADMIN — PHENYLEPHRINE HYDROCHLORIDE 100 MCG: 10 INJECTION INTRAVENOUS at 12:08

## 2023-01-17 RX ADMIN — PROPOFOL 50 MG: 10 INJECTION, EMULSION INTRAVENOUS at 11:35

## 2023-01-17 RX ADMIN — ONDANSETRON 4 MG: 2 INJECTION INTRAMUSCULAR; INTRAVENOUS at 13:50

## 2023-01-17 RX ADMIN — MIDAZOLAM HYDROCHLORIDE 1 MG: 1 INJECTION, SOLUTION INTRAMUSCULAR; INTRAVENOUS at 10:42

## 2023-01-17 RX ADMIN — FENTANYL CITRATE 50 MCG: 50 INJECTION INTRAMUSCULAR; INTRAVENOUS at 10:52

## 2023-01-17 RX ADMIN — OXYCODONE HYDROCHLORIDE 5 MG: 5 TABLET ORAL at 20:21

## 2023-01-17 RX ADMIN — PHENYLEPHRINE HYDROCHLORIDE 100 MCG: 10 INJECTION INTRAVENOUS at 12:05

## 2023-01-17 RX ADMIN — Medication 1 LOZENGE: at 20:21

## 2023-01-17 RX ADMIN — FENTANYL CITRATE 50 MCG: 50 INJECTION, SOLUTION INTRAMUSCULAR; INTRAVENOUS at 12:35

## 2023-01-17 RX ADMIN — MIDAZOLAM 1 MG: 1 INJECTION INTRAMUSCULAR; INTRAVENOUS at 11:18

## 2023-01-17 RX ADMIN — ACETAMINOPHEN 975 MG: 325 TABLET, FILM COATED ORAL at 22:39

## 2023-01-17 RX ADMIN — PROPOFOL 120 MG: 10 INJECTION, EMULSION INTRAVENOUS at 11:27

## 2023-01-17 RX ADMIN — PHENYLEPHRINE HYDROCHLORIDE 100 MCG: 10 INJECTION INTRAVENOUS at 11:44

## 2023-01-17 ASSESSMENT — ACTIVITIES OF DAILY LIVING (ADL)
ADLS_ACUITY_SCORE: 38
ADLS_ACUITY_SCORE: 35

## 2023-01-17 NOTE — ANESTHESIA PROCEDURE NOTES
Brachial plexus Procedure Note    Pre-Procedure   Staff -        Anesthesiologist:  Emanuel Estrada MD       Resident/Fellow: Augustin Che MD       Performed By: resident       Location: pre-op       Pre-Anesthestic Checklist: patient identified, IV checked, site marked, risks and benefits discussed, informed consent, monitors and equipment checked, pre-op evaluation, at physician/surgeon's request and post-op pain management  Timeout:       Correct Patient: Yes        Correct Procedure: Yes        Correct Site: Yes        Correct Position: Yes        Correct Laterality: Yes        Site Marked: Yes  Procedure Documentation  Procedure: Brachial plexus       Diagnosis: POST OPERATIVE PAIN       Laterality: right       Patient Position: supine       Patient Prep/Sterile Barriers: sterile gloves, mask       Skin prep: Chloraprep (interscalene approach).       Needle Type: short bevel       Needle Gauge: 21.        Needle Length (millimeters): 110        Ultrasound guided       1. Ultrasound was used to identify targeted nerve, plexus, vascular marker, or fascial plane and place a needle adjacent to it in real-time.       2. Ultrasound was used to visualize the spread of anesthetic in close proximity to the above referenced structure.       3. A permanent image is entered into the patient's record.    Assessment/Narrative         The placement was negative for: blood aspirated, painful injection and site bleeding       Paresthesias: No.       Bolus given via needle..        Secured via.        Insertion/Infusion Method: Single Shot       Complications: none       Injection made incrementally with aspirations every 5 mL.    Medication(s) Administered   Bupivacaine 0.25% PF (Infiltration) - Infiltration   10 mL - 1/17/2023 10:54:00 AM  Bupivacaine liposome (Exparel) 1.3% LA inj susp (Infiltration) - Infiltration   10 mL - 1/17/2023 10:54:00 AM    FOR [facility] ([facility] ONLY:   Pain Team Contact  "information: please page the Pain Team Via [facility]. Search \"Pain\". During daytime hours, please page the attending first. At night please page the resident first.    "

## 2023-01-17 NOTE — ANESTHESIA POSTPROCEDURE EVALUATION
Patient: Davey Burciaga    Procedure: Procedure(s):  ARTHROPLASTY, RIGHT SHOULDER, TOTAL, REVERSE       Anesthesia Type:  General    Note:  Disposition: Admission   Postop Pain Control: Uneventful            Sign Out: Well controlled pain   PONV: No   Neuro/Psych: Uneventful            Sign Out: Acceptable/Baseline neuro status   Airway/Respiratory: Uneventful            Sign Out: Acceptable/Baseline resp. status   CV/Hemodynamics: Uneventful            Sign Out: Acceptable CV status; No obvious hypovolemia; No obvious fluid overload   Other NRE: NONE   DID A NON-ROUTINE EVENT OCCUR? No           Last vitals:  Vitals Value Taken Time   /106 01/17/23 1545   Temp 37  C (98.6  F) 01/17/23 1530   Pulse 85 01/17/23 1546   Resp 27 01/17/23 1548   SpO2 98 % 01/17/23 1547   Vitals shown include unvalidated device data.    Electronically Signed By: Geri Thomas MD  January 17, 2023  4:37 PM  
WDL
Universal Safety Interventions

## 2023-01-17 NOTE — ANESTHESIA CARE TRANSFER NOTE
Patient: Davey Burciaga    Procedure: Procedure(s):  ARTHROPLASTY, RIGHT SHOULDER, TOTAL, REVERSE       Diagnosis: Nontraumatic complete tear of right rotator cuff [M75.121]  Diagnosis Additional Information: No value filed.    Anesthesia Type:   General     Note:    Oropharynx: oropharynx clear of all foreign objects  Level of Consciousness: awake  Oxygen Supplementation: face mask  Level of Supplemental Oxygen (L/min / FiO2): 8  Independent Airway: airway patency satisfactory and stable  Dentition: dentition unchanged  Vital Signs Stable: post-procedure vital signs reviewed and stable  Report to RN Given: handoff report given  Patient transferred to: PACU    Handoff Report: Identifed the Patient, Identified the Reponsible Provider, Reviewed the pertinent medical history, Discussed the surgical course, Reviewed Intra-OP anesthesia mangement and issues during anesthesia, Set expectations for post-procedure period and Allowed opportunity for questions and acknowledgement of understanding      Vitals:  Vitals Value Taken Time   BP     Temp     Pulse     Resp     SpO2         Electronically Signed By: LANETTE Davidson CRNA  January 17, 2023  2:28 PM

## 2023-01-17 NOTE — PROGRESS NOTES
PACU to Inpatient Nursing Handoff    Patient Davey Burciaga is a 67 year old male who speaks English.   Procedure Procedure(s):  ARTHROPLASTY, RIGHT SHOULDER, TOTAL, REVERSE   Surgeon(s) Primary: Marisel Moraes MD  Resident - Assisting: Rodolfo Rodriguez MD     No Known Allergies    Isolation  No active isolations     Past Medical History   has no past medical history on file.    Anesthesia General with Block   Dermatome Level     Preop Meds acetaminophen (Tylenol) - time given: 1430   Nerve block Brachial Plexus .  Location:right. Med:Exparel (liposomal bupivacaine). Time given: 1451   Intraop Meds Bupivacaine 0.25% PF (Infiltration) (mL)  Total volume:  10 mL  Date/Time Rate/Dose/Volume Action Route Admin User Audit    01/17/23 1054 10 mL Given Infiltration Augustin Che MD       Bupivacaine liposome (Exparel) 1.3% LA inj susp (Infiltration) (mL)  Total volume:  10 mL  Date/Time Rate/Dose/Volume Action Route Admin User Audit    01/17/23 1054 10 mL Given Infiltration Augustin Che MD       midazolam 1mg/mL (mg)  Total dose:  1 mg  Date/Time Rate/Dose/Volume Action Route Admin User Audit    01/17/23 1118 1 mg Given Intravenous Deb Recinos APRN CRNA       fentaNYL (SUBLIMAZE) injection (mcg)  Total dose:  100 mcg  Date/Time Rate/Dose/Volume Action Route Admin User Audit    01/17/23 1235 50 mcg Given Intravenous Deb Recinos APRN CRNA     1409 50 mcg Given Intravenous Deb Recinos APRN CRNA       lidocaine 2% (mg)  Total dose:  100 mg  Date/Time Rate/Dose/Volume Action Route Admin User Audit    01/17/23 1127 100 mg Given Intravenous Eula Reinoso APRN CRNA       propofol (DIPRIVAN) injection 10 mg/mL vial (mg)  Total dose:  350 mg  Date/Time Rate/Dose/Volume Action Route Admin User Audit    01/17/23 1127 120 mg Given Intravenous Eula Reinoso APRN CRNA     1129 80 mg Given Intravenous Eula Reinoso APRN CRNA     1131 50 mg Given Intravenous Kemar,  LANETTE Chen CRNA     1132 50 mg Given Intravenous Eula Reinoso APRN CRNA     1135 50 mg Given Intravenous Elua Reinoso APRN CRNA       rocuronium 10mg/mL (mg)  Total dose:  50 mg  Date/Time Rate/Dose/Volume Action Route Admin User Audit    01/17/23 1131 50 mg Given Intravenous Eula Reinoso APRN CRNA       dexamethasone 4mg/mL (mg)  Total dose:  6 mg  Date/Time Rate/Dose/Volume Action Route Admin User Audit    01/17/23 1156 6 mg Given Intravenous Deb Recinos APRN CRNA       ondansetron 2mg/mL (mg)  Total dose:  4 mg  Date/Time Rate/Dose/Volume Action Route Admin User Audit    01/17/23 1350 4 mg Given Intravenous Deb Recinos APRN CRNA       ePHEDrine 5 mg/mL (mg)  Total dose:  10 mg  Date/Time Rate/Dose/Volume Action Route Admin User Audit    01/17/23 1222 10 mg Given Intravenous Deb Recinos APRN CRNA       phenylephrine (JAMEE-SYNEPHRINE) injection (mcg)  Total dose:  500 mcg  Date/Time Rate/Dose/Volume Action Route Admin User Audit    01/17/23 1129 100 mcg New Bag Intravenous Eula Reinoso APRN CRNA     1140 100 mcg Bolus Intravenous Deb Recinos APRN CRNA     1144 100 mcg Bolus Intravenous Eula Reinoso APRN CRNA     1205 100 mcg Bolus Intravenous Deb Recinos APRN CRNA     1208 100 mcg Bolus Intravenous Deb Recinos APRN CRNA       glycopyrrolate 0.2mg/mL (mg)  Total dose:  0.2 mg  Date/Time Rate/Dose/Volume Action Route Admin User Audit    01/17/23 1213 0.2 mg Given Intravenous Deb Recinos APRN CRNA       sugammadex 200 mg/2ml (mg)  Total dose:  200 mg  Date/Time Rate/Dose/Volume Action Route Admin User Audit    01/17/23 1413 200 mg Given Intravenous Deb Recinos APRN CRNA       ceFAZolin Sodium (ANCEF) injection 2 g (g)  Total dose:  2 g Dosing weight:  97.1  Date/Time Rate/Dose/Volume Action Route Admin User Audit    01/17/23 1146 2 g (over 3 min) Given Intravenous Deb Recinos, LANETTE CRNA        phenylephrine 0.1 mg/mL infusion (mcg/kg/min) (mcg/kg/min)  Total dose:  10.35 mg Dosing weight:  95  Date/Time Rate/Dose/Volume Action Route Admin User Audit    01/17/23 1145 0.3 mcg/kg/min - 17.1 mL/hr New Bag Intravenous JorjeDeb wild Fortino APRN CRNA     1154 0.5 mcg/kg/min - 28.5 mL/hr Rate Change Intravenous JorjeDeb Fortino, APRN CRNA     1202 0.8 mcg/kg/min - 45.6 mL/hr Rate Change Intravenous Jorje, Deb Fortino, APRN CRNA     1232 1 mcg/kg/min - 57 mL/hr Rate Change Intravenous Jorje, Deb Smithan, APRN CRNA     1237  Stopped Intravenous Jorje, Deb Smithan, APRN CRNA     1246 1 mcg/kg/min - 57 mL/hr Restarted Intravenous Jorje, Deb Smithan, APRN CRNA     1253 0.7 mcg/kg/min - 39.9 mL/hr Rate Change Intravenous Jorje, Deb Smithan, APRN CRNA     1301 1 mcg/kg/min - 57 mL/hr Rate Change Intravenous Jorje, Deb Smithan, APRN CRNA     1313 0.9 mcg/kg/min - 51.3 mL/hr Rate Change Intravenous Jorje, Deb Smithan, APRN CRNA     1407  Stopped Intravenous Jorje, Deb Fortino, APRN CRNA       LR (mL)  Total volume:  1,200 mL  Date/Time Rate/Dose/Volume Action Route Admin User Audit    01/17/23 1114  New Bag Intravenous Eula Reinoso, APRN CRNA     1327 1,000 mL New Bag Intravenous JorjeDeb wildan, APRN CRNA     1424 200 mL Anesthesia Volume Adjustment Intravenous Jorje, Deb Freitas, APRN CRNA          Local Meds No   Antibiotics cefazolin (Ancef) - last given at 1146     Pain Patient Currently in Pain: denies   PACU meds  Not applicable   PCA / epidural No   Capnography     Telemetry ECG Rhythm: Normal sinus rhythm   Inpatient Telemetry Monitor Ordered? No        Labs Glucose No results found for: GLC    Hgb No results found for: HGB    INR No results found for: INR   PACU Imaging Not applicable     Wound/Incision Incision/Surgical Site 01/17/23 Right Shoulder (Active)   Incision Assessment UTV 01/17/23 1436   Dressing Intervention Clean, dry, intact 01/17/23 1436   Number of days: 0      CMS         Equipment ice pack   Other LDA       IV Access Peripheral IV 01/17/23 Left Hand (Active)   Site Assessment WDL 01/17/23 1436   Line Status Infusing 01/17/23 1436   Dressing Intervention New dressing  01/17/23 0939   Phlebitis Scale 0-->no symptoms 01/17/23 1436   Infiltration Scale 0 01/17/23 1436   Infiltration Site Treatment Method  None 01/17/23 0939   Number of days: 0       Peripheral IV 01/17/23 Left Foot (Active)   Site Assessment WDL 01/17/23 1436   Line Status Saline locked 01/17/23 1436   Phlebitis Scale 0-->no symptoms 01/17/23 1436   Infiltration Scale 0 01/17/23 1436   Number of days: 0      Blood Products Not applicable EBL 0 mL   Intake/Output Date 01/17/23 0700 - 01/18/23 0659   Shift 5404-2281 6538-9222 4888-1578 24 Hour Total   INTAKE   I.V. 1200   1200   Shift Total(mL/kg) 1200(12.61)   1200(12.61)   OUTPUT   Shift Total(mL/kg)       Weight (kg) 95.2 95.2 95.2 95.2      Drains / Torres     Time of void PreOp Void Prior to Procedure: 0830 (01/17/23 0911)    PostOp      Diapered? no   Bladder Scan  329 ml   PO   250 water     Vitals    B/P: 115/76  T: 98.9  F (37.2  C)    Temp src: Oral  P:  Pulse: 94 (01/17/23 1430)          R: 18  O2:  SpO2: 99 %    O2 Device: Simple face mask (01/17/23 1422)    Oxygen Delivery: 6 LPM (01/17/23 1422)         Family/support present significant other   Patient belongings     Patient transported on cart and air mat   DC meds/scripts (obs/outpt) Not applicable   Inpatient Pain Meds Released? Yes       Special needs/considerations None   Tasks needing completion None       Jesu Patel RN  ASCOM [id number]

## 2023-01-17 NOTE — ANESTHESIA PROCEDURE NOTES
Airway       Patient location during procedure: OR       Procedure Start/Stop Times: 1/17/2023 11:36 AM  Staff -        Other Anesthesia Staff: Kimym De Souza       Performed By: SRNA  Consent for Airway        Urgency: elective  Indications and Patient Condition       Indications for airway management: marlen-procedural       Induction type:intravenous       Mask difficulty assessment: 2 - vent by mask + OA or adjuvant +/- NMBA    Final Airway Details       Final airway type: endotracheal airway       Successful airway: ETT - single  Endotracheal Airway Details        ETT size (mm): 7.5       Cuffed: yes       Successful intubation technique: direct laryngoscopy       DL Blade Type: [place] 2       Grade View of Cords: 2       Adjucts: stylet       Position: Left       Measured from: gums/teeth       Secured at (cm): 22       Bite block used: None    Post intubation assessment        Placement verified by: capnometry, equal breath sounds and chest rise        Number of attempts at approach: 2       Number of other approaches attempted: 0       Secured with: pink tape       Ease of procedure: easy       Dentition: Unchanged    Medication(s) Administered   Medication Administration Time: 1/17/2023 11:36 AM

## 2023-01-17 NOTE — BRIEF OP NOTE
[facility]    Brief Operative Note    Pre-operative diagnosis: Cuff Tear Arthropathy, R shoulder  Post-operative diagnosis Same as pre-operative diagnosis    Procedure: Procedure(s):  ARTHROPLASTY, RIGHT SHOULDER, TOTAL, REVERSE  Surgeon: Surgeon(s) and Role:     * Marisel Moraes MD - Primary     * Rodolfo Rodriguez MD - Resident - Assisting  Anesthesia: General with Block   Estimated Blood Loss: 200 ml    Drains: None  Specimens: * No specimens in log *  Findings:   Cuff tear arthropathy.  Complications: None.  Implants:   Implant Name Type Inv. Item Serial No.  Lot No. LRB No. Used Action   IMP BASEPLATE RVRS SHLDR ZIM MED AUG [id number] - [id number] Total Joint Component/Insert IMP BASEPLATE RVRS SHLDR ZIM MED AUG [id number]  [employer] [id number] Right 1 Implanted   IMP SCR CENTRAL BIOMET REV SHLDR 6.5X30MM [id number] - [id number] Metallic Hardware/[place] IMP SCR CENTRAL BIOMET REV SHLDR 6.5X30MM 790224  [employer] [id number] Right 1 Implanted   IMP SCR LOCKING BIOM REV SHLDR 3.5 HEX 4.93Z65CZ 757371 - [id number] Total Joint Component/Insert IMP SCR LOCKING BIOM REV SHLDR 3.5 HEX 4.27M07ZD 644690  [employer] [id number] Right 1 Implanted   IMP SCR LOCKING BIOM REV SHLDR 3.5 HEX 4.94N98BS 608376 - [id number] Total Joint Component/Insert IMP SCR LOCKING BIOM REV SHLDR 3.5 HEX 4.79S09HT 774312  [employer] [id number] Right 1 Implanted   IMP SCR LOCKING BIOM REV SHLDR 3.5 HEX 4.17A53NU 714819 - [id number] Metallic Hardware/[place] IMP SCR LOCKING BIOM REV SHLDR 3.5 HEX 4.22L38GZ 796382  [employer] [id number] Right 1 Implanted   IMP GLENOSPHERE BIOM REV SHLDR 36MM STD 318834 - [id number] Total Joint Component/Insert IMP GLENOSPHERE BIOM REV SHLDR 36MM STD 659858  [employer] [id number] Right 1 Implanted   IMP SCR LOCKING BIOM REV SHLDR 3.5 HEX 4.43W29HG 759540 - [id number] Metallic Hardware/[place] IMP SCR LOCKING BIOM REV SHLDR 3.5 HEX 4.25G02HL  638119  [employer] [id number] Right 1 Implanted   IMP STEM PRIMARY SHLDR MICRO ZIM 15MM  001032 - [id number] Total Joint Component/Insert IMP STEM PRIMARY SHLDR MICRO ZIM 15MM  767859  [employer] [id number] Right 1 Implanted   IMP BEARING HUMERAL ZIM 36MM STD PRLNG [id number] - [id number] Total Joint Component/Insert IMP BEARING HUMERAL ZIM 36MM STD PRLNG [id number]  [employer] [id number] Right 1 Implanted   IMP HUMERAL TRAY ZIM RVRS SHLDR STD [id number] - [id number] Total Joint Component/Insert IMP HUMERAL TRAY ZIM RVRS SHLDR STD [id number]  [employer] [id number] Right 1 Implanted   IMP HUMERAL TRAY ZIM RVRS SHLDR STD [id number] - [id number] Total Joint Component/Insert IMP HUMERAL TRAY ZIM RVRS SHLDR STD [id number]  [employer] [id number] Right 1 Implanted     Removed 3 metal anchors in greater tuberosity

## 2023-01-18 ENCOUNTER — APPOINTMENT (OUTPATIENT)
Dept: OCCUPATIONAL THERAPY | Facility: CLINIC | Age: 68
End: 2023-01-18
Attending: ORTHOPAEDIC SURGERY
Payer: COMMERCIAL

## 2023-01-18 VITALS
SYSTOLIC BLOOD PRESSURE: 130 MMHG | DIASTOLIC BLOOD PRESSURE: 78 MMHG | WEIGHT: 209.88 LBS | TEMPERATURE: 97.9 F | OXYGEN SATURATION: 95 % | BODY MASS INDEX: 32.94 KG/M2 | HEART RATE: 94 BPM | RESPIRATION RATE: 18 BRPM | HEIGHT: 67 IN

## 2023-01-18 PROCEDURE — 97165 OT EVAL LOW COMPLEX 30 MIN: CPT | Mod: GO

## 2023-01-18 PROCEDURE — 97535 SELF CARE MNGMENT TRAINING: CPT | Mod: GO

## 2023-01-18 PROCEDURE — 250N000011 HC RX IP 250 OP 636: Performed by: ORTHOPAEDIC SURGERY

## 2023-01-18 PROCEDURE — 250N000013 HC RX MED GY IP 250 OP 250 PS 637: Performed by: ORTHOPAEDIC SURGERY

## 2023-01-18 PROCEDURE — 250N000013 HC RX MED GY IP 250 OP 250 PS 637: Performed by: INTERNAL MEDICINE

## 2023-01-18 PROCEDURE — 99214 OFFICE O/P EST MOD 30 MIN: CPT | Performed by: INTERNAL MEDICINE

## 2023-01-18 RX ORDER — POLYETHYLENE GLYCOL 3350 17 G/17G
17 POWDER, FOR SOLUTION ORAL DAILY
Qty: 10 PACKET | Refills: 0 | Status: SHIPPED | OUTPATIENT
Start: 2023-01-18

## 2023-01-18 RX ORDER — IBUPROFEN 600 MG/1
600 TABLET, FILM COATED ORAL EVERY 6 HOURS PRN
Qty: 40 TABLET | Refills: 0 | Status: SHIPPED | OUTPATIENT
Start: 2023-01-18

## 2023-01-18 RX ORDER — AMOXICILLIN 250 MG
1 CAPSULE ORAL 2 TIMES DAILY
Qty: 30 TABLET | Refills: 0 | Status: SHIPPED | OUTPATIENT
Start: 2023-01-18

## 2023-01-18 RX ORDER — ACETAMINOPHEN 325 MG/1
650 TABLET ORAL EVERY 4 HOURS PRN
Qty: 60 TABLET | Refills: 0 | Status: SHIPPED | OUTPATIENT
Start: 2023-01-20

## 2023-01-18 RX ORDER — OXYCODONE HYDROCHLORIDE 5 MG/1
5 TABLET ORAL EVERY 4 HOURS PRN
Qty: 26 TABLET | Refills: 0 | Status: SHIPPED | OUTPATIENT
Start: 2023-01-18

## 2023-01-18 RX ADMIN — SENNOSIDES AND DOCUSATE SODIUM 1 TABLET: 8.6; 5 TABLET ORAL at 08:58

## 2023-01-18 RX ADMIN — CEFAZOLIN SODIUM 2 G: 2 INJECTION, SOLUTION INTRAVENOUS at 04:23

## 2023-01-18 RX ADMIN — ZOLPIDEM TARTRATE 10 MG: 10 TABLET, FILM COATED ORAL at 04:29

## 2023-01-18 RX ADMIN — POLYETHYLENE GLYCOL 3350 17 G: 17 POWDER, FOR SOLUTION ORAL at 08:58

## 2023-01-18 RX ADMIN — OXYCODONE HYDROCHLORIDE 5 MG: 5 TABLET ORAL at 04:23

## 2023-01-18 RX ADMIN — OXYCODONE HYDROCHLORIDE 5 MG: 5 TABLET ORAL at 00:30

## 2023-01-18 RX ADMIN — ASPIRIN 162 MG: 81 TABLET, COATED ORAL at 08:58

## 2023-01-18 ASSESSMENT — ACTIVITIES OF DAILY LIVING (ADL)
ADLS_ACUITY_SCORE: 38
ADLS_ACUITY_SCORE: 36
ADLS_ACUITY_SCORE: 39

## 2023-01-18 NOTE — PLAN OF CARE
"Pt. discharged at 1200 to home, and left with personal belongings. Pt. received complete discharge paperwork and all medications as filled by discharge pharmacy. Pt received and signed for the narcotic medication Oxycodone. Pt. was given times of last dose for all discharge medications in writing on discharge medication sheets. Discharge teaching included all medication, pain management, activity restrictions, dressing changes, and signs and symptoms of infection. Dressing supplies sent home. Pt. to follow up with Dr. Moraes in 6 weeks. Pt. had no further questions at the time of discharge and no unmet needs were identified.    /78   Pulse 94   Temp 97.9  F (36.6  C) (Oral)   Resp 18   Ht 1.702 m (5' 7.01\")   Wt 95.2 kg (209 lb 14.1 oz)   SpO2 95%   BMI 32.86 kg/m      Patient vital signs are at baseline: Yes  Patient able to ambulate as they were prior to admission or with assist devices provided by therapies during their stay:  Yes  Patient MUST void prior to discharge:  Yes  Patient able to tolerate oral intake:  Yes  Pain has adequate pain control using Oral analgesics:  Yes  Does patient have an identified :  Yes  Has goal D/C date and time been discussed with patient:  Yes    "

## 2023-01-18 NOTE — PROGRESS NOTES
Orthopaedic Surgery Progress Note 01/18/2023    Subjective  No acute events overnight. Did not sleep well and took his home ambien at 0430, nursing asked to minimize awakenings and therefore came back later.  Pain  controlled. Denies new numbness, tingling, or weakness.  Tolerating diet without nausea or vomiting.  Voiding spontaneously.  +flatus, -BM.   Denies fevers, chills, chest pain, SOB.  Ambulating, working with OT    Objective  Temp: 97.9  F (36.6  C) Temp src: Oral BP: 119/67 Pulse: 86   Resp: 18 SpO2: 94 % O2 Device: None (Room air) Oxygen Delivery: 2 LPM    Exam:  Gen: No acute distress, resting comfortably in bed.  Resp: Non-labored breathing  Cardio: Regular rate via peripheral pulse  MSK:  RUE:   - Dressings c/d/I, small area of shallow drainage (smaller than a pencil eraser on distal end of dressing)  -Sling in place  - Fires EPL, FPL, Intrinsics  - SILT medial/radial/ulnar/axillary nerves  - Radial pulse 2+, hand wwp      Assessment: Davey Burciaga is a 67 year old male s/p reverse TSA on 1/17/2023 with Dr. Moraes. Doing well.    Plan:  Orthopedics primary  Activity: Up with assist.  Weight bearing status: Nonweightbearing right upper extremity.  Antibiotics: Ancef x24 hours perioperatively.  Diet: Begin with clear fluids and progress diet as tolerated newly.  DVT prophylaxis: ASA 162mg and mechanical while in the hospital, discharge on ASA 162mg x 4 weeks.  Bracing/Splinting: Sling to be kept clean, dry, and intact until follow-up.  Elevation: Elevate right upper extremity on pillows  Wound Care: Aquacel dressing, leave intact.  If saturated please page Ortho  Drains: None  Pain management: transition from IV to orals as tolerated.   X-rays: X-rays completed in PACU  Physical Therapy: ROM, ADL's.  Occupational Therapy: ADL's.  Labs: Trend Hgb on POD #1, 2, 3  Cultures: None  Consults: PT, OT, medicine, appreciate assistance in caring for this patient.  Follow-up: Clinic with Dr. Moraes in 2  weeks.    Disposition: Pending progress with therapies, pain control on orals, and medical stability, anticipate discharge to home on POD #1-2.    Rodolfo Rodriguez, DO  PGY-1  Orthopaedic Surgery  Pager: [phone]     Please page me directly with any questions/concerns during regular weekday hours before 5 pm. If there is no response, if it is a weekend, or if it is during evening hours then please page the orthopedic surgery resident on call.       Future Appointments   Date Time Provider Department [place]   1/18/2023  8:15 AM Taylor Diaz, OT UROT [place]   3/6/2023  3:00 PM Marisel Moraes MD [facility]

## 2023-01-18 NOTE — CONSULTS
"[facility]  Consult Note - Hospitalist Service  Date of Admission:  1/17/2023  Consult Requested by: Ortho   Reason for Consult: Medical co-management     Assessment & Plan   Davey Burciaga is a 67 year old male admitted on 1/17/2023. POD # 0 s/p ARTHROPLASTY, RIGHT SHOULDER, TOTAL, REVERSE.  ml. Mo complications during surgery.   Internal medicine consulted for medical co-management. He has a PMHx significant for Anxiety, hx of bilateral hearing loss, carotid artery stenosis, essential tremors, GERD and depression.     ARTHROPLASTY, RIGHT SHOULDER, TOTAL, REVERSE  -Ortho is primary team.   -Pain control, DVT prophylaxis and marlen-op antibiotics per primary team.   -Gentle hydration.   -PT and OT per protocol.   -Capnography per protocol.   -Monitor HGB.   -Bowel regimen.    Dyslipidemia   Hx of carotid artery stenosis   -Continue on PTA Zocor.   -Hold PTA ASA for now, restart per Ortho.     Essential tremors  -Hold PTA Inderal for now.     Depression   -Continue on PTA Zoloft.        Clinically Significant Risk Factors Present on Admission                       # Obesity: Estimated body mass index is 32.86 kg/m  as calculated from the following:    Height as of this encounter: 1.702 m (5' 7.01\").    Weight as of this encounter: 95.2 kg (209 lb 14.1 oz).           Wenceslao Moreno MD  Hospitalist Service  Securely message with [employer] (more info)  Text page via [facility] Paging/Directory   ______________________________________________________________________    Chief Complaint     Shoulder pain     History is obtained from the patient    History of Present Illness   Davey Burciaga is a 67 year old male admitted on 1/17/2023. POD # 0 s/p ARTHROPLASTY, RIGHT SHOULDER, TOTAL, REVERSE.  ml. Mo complications during surgery.   Internal medicine consulted for medical co-management. He has a PMHx significant for Anxiety, hx of bilateral hearing loss, carotid " artery stenosis, essential tremors, GERD and depression.     I saw the patient after surgery in the medical floor. Patient was pleasant, oriented and looked comfortable. Patient denies chest pain, palpitations, shortness of breath, cough, nausea, vomit, dizziness, lightheadedness, fever, chills, diaphoresis, abdominal pain or dysuria.       Past Medical History    History reviewed. No pertinent past medical history.    Past Surgical History   History reviewed. No pertinent surgical history.    Medications   Medications Prior to Admission   Medication Sig Dispense Refill Last Dose     aspirin (ASA) 81 MG chewable tablet Take 81 mg by mouth daily   Past Week     LORazepam (ATIVAN) 0.5 MG tablet TAKE 1 TO 2 TABLETS BY MOUTH TWICE DAILY AS NEEDED   Past Month     propranolol ER (INDERAL LA) 80 MG 24 hr capsule    1/16/2023 at 2100     sertraline (ZOLOFT) 100 MG tablet Take 100 mg by mouth daily   1/16/2023 at 2100     simvastatin (ZOCOR) 20 MG tablet Take 10 mg by mouth At Bedtime   1/16/2023 at 2100     Vitamin D3 (CHOLECALCIFEROL) 25 mcg (1000 units) tablet Take 1 tablet by mouth daily   Past Month     zolpidem (AMBIEN) 10 MG tablet Take 10 mg by mouth nightly as needed for sleep             Review of Systems    The 10 point Review of Systems is negative other than noted in the HPI or here. Other ROS negative.      Physical Exam   Vital Signs: Temp: 97.9  F (36.6  C) Temp src: Oral BP: 134/75 Pulse: 86   Resp: 16 SpO2: 94 % O2 Device: None (Room air) Oxygen Delivery: 2 LPM  Weight: 209 lbs 14.05 oz    General Appearance: Alert, on O 2 NC, no acute distress.   Respiratory: Normal respiratory effort, clear lungs. No crackles or wheezing.   Cardiovascular: RRR.  GI: Abdomens soft, + BS, no tenderness to palpation.   Skin: No rash.   Other: Right shoulder with dressing and sling. Alert, oriented x 3, moving all extremities.     Medical Decision Making       50 MINUTES SPENT BY ME on the date of service doing chart review,  history, exam, documentation & further activities per the note.      Data

## 2023-01-18 NOTE — PLAN OF CARE
[facility]      OUTPATIENT OCCUPATIONAL THERAPY  EVALUATION  PLAN OF TREATMENT FOR OUTPATIENT REHABILITATION  (COMPLETE FOR INITIAL CLAIMS ONLY)  Patient's Last Name, First Name, M.I.  YOB: 1955  Davey Burciaga                          Provider's Name  [facility] Medical Record No.  [id number]                               Onset Date:  01/17/23   Start of Care Date:        Type:     ___PT   _X_OT   ___SLP Medical Diagnosis:                           OT Diagnosis:  impaired IND for ADLs   Visits from SOC:  1   _________________________________________________________________________________  Plan of Treatment/Functional Goals    Planned Interventions: ADL retraining   Goals: See Occupational Therapy Goals on Care Plan in [facility] electronic health record.    Therapy Frequency: One time eval and treatment  Predicted Duration of Therapy Intervention: 01/18/23  _________________________________________________________________________________    I CERTIFY THE NEED FOR THESE SERVICES FURNISHED UNDER        THIS PLAN OF TREATMENT AND WHILE UNDER MY CARE     (Physician co-signature of this document indicates review and certification of the therapy plan).               ,      Referring Physician: Marisel Moraes MD            Initial Assessment        See Occupational Therapy evaluation dated   in Epic electronic health record.

## 2023-01-18 NOTE — PROGRESS NOTES
"[facility]    Medicine Progress Note - Hospitalist Service, GOLD TEAM 17    Date of Admission:  1/17/2023    Assessment & Plan     A: Patient is a 66 y/o man who has carotid artery stenosis, GERD, depression, anxiety and history of bilateral hearing loss. Patient underwent right shoulder total reverse arthroplasty on 17-Jan-2023 for right shoulder cuff tear arthropathy.     P:  1.) Right shoulder cuff tear arthropathy s/p right shoulder total reverse arthroplasty: Patient receiving post-operative care. Patient on scheduled acetaminophen. Patient on acetaminophen, oxycodone and ibuprofen as needed. Patient on aspirin for DVT prophylaxis.  2.) Hyperlipidemia: Patient on zocor.  3.) Carotid artery stenosis: Further monitoring as outpatient.  4.) Essential tremor: Patient to resume propranolol as outpatient.  5.) Depression: Patient on zoloft.  6.) Obstructive sleep apnea, patient not using CPAP: F/u as outpatient.       Diet: Advance Diet as Tolerated: Regular Diet Adult  Diet    Torres Catheter: Not present  Lines: None     Cardiac Monitoring: None  Code Status: Full Code      Clinically Significant Risk Factors Present on Admission                       # Obesity: Estimated body mass index is 32.86 kg/m  as calculated from the following:    Height as of this encounter: 1.702 m (5' 7.01\").    Weight as of this encounter: 95.2 kg (209 lb 14.1 oz).           Disposition Plan      Expected Discharge Date: 01/18/2023                  Champ Rock MD  Hospitalist Service, GOLD TEAM 17  [facility]  Securely message with [employer] (more info)  Text page via [employer] Paging/Directory   See signed in provider for up to date coverage information  ______________________________________________________________________    Interval History     Patient noted feeling well. Patient noted pain controlled. Patient noted no fever, no chills, no nausea " and no vomiting.     Physical Exam   Vital Signs: Temp: 97.9  F (36.6  C) Temp src: Oral BP: 130/78 Pulse: 94   Resp: 18 SpO2: 95 % O2 Device: None (Room air) Oxygen Delivery: 2 LPM  Weight: 209 lbs 14.05 oz    General: Patient comfortable, NAD.  Heart: RRR, S1 S2 w/o murmurs.  Lungs: Breath sounds present. No crackles/wheezes heard.  Abdomen: Firm, nontender.      Medical Decision Making

## 2023-01-18 NOTE — PLAN OF CARE
Pt reported around 10pm some delays in care and was not happy at that time. Remedied patient's issues, repositioned pt in bed and helped with CAPNO. Pt said an NST helped him and was unhappy with him so pt did not want that NST back. Patient doing good now with no new concerns.    Pt wanted to apologize to NST for his behavior; NST has left for the day.

## 2023-01-18 NOTE — PLAN OF CARE
Pt. Arrived to unit at 1625 from PACU. A&Ox4. VSS. Afebrile. Lung sounds CTA. Maintaining sats on RA. IS encouraged, tolerating well, achieving 1500mL. Bowel sounds active, LBM 1/17 prior to surgery. CMS and neuro's are intact. Reports numbness in RUE, tingling in R thumb, index and middle fingers that is getting better, otherwise denies. Denies nausea, shortness of breath, and chest pain. Reported very minimal pain in RUE, Exparel block, pain managed currently w/ ice pack. Voids spontaneously without difficulty in the BR, voided 400mL w/ 150mL PVR. Tolerating regular diet. RUE incisional dressing is CDI, small area of shadow drainage, smaller than pencil eraser, on distal end of dressing. Pt up with Ax1 walker and gait belt. PIV is patent and infusing in LUE, R foot PIV patent and SL. PCD's on BLE's. Call light is within reach, pt able to make needs known and is resting comfortably between cares. Will continue to monitor.

## 2023-01-18 NOTE — PLAN OF CARE
Occupational Therapy Discharge Summary    Reason for therapy discharge:    All goals and outcomes met, no further needs identified.    Progress towards therapy goal(s). See goals on Care Plan in [facility] electronic health record for goal details.  Goals met    Therapy recommendation(s):    No further therapy is recommended.  Continue home exercise program.

## 2023-01-18 NOTE — PROGRESS NOTES
01/18/23 1000   Appointment Info   Signing Clinician's Name / Credentials (OT) GUS Sanders   Student Supervision Therapy services provided with the co-signing licensed therapist guiding and directing the services, and providing the skilled judgement and assessment throughout the session   Rehab Comments (OT) (R) sling with pillow   Living Environment   People in Home spouse   Current Living Arrangements house   Home Accessibility stairs to enter home;stairs within home   Number of Stairs, Main Entrance 2   Stair Railings, Main Entrance railings safe and in good condition   Number of Stairs, Within Home, Primary seven   Stair Railings, Within Home, Primary railings safe and in good condition   Transportation Anticipated family or friend will provide   Living Environment Comments split level home, has lift chair STS   Self-Care   Usual Activity Tolerance good   Current Activity Tolerance moderate   Regular Exercise No   Equipment Currently Used at Home grab bar, tub/shower;shower chair;raised toilet seat   Fall history within last six months no   Activity/Exercise/Self-Care Comment IND at baseline   Instrumental Activities of Daily Living (IADL)   Previous Responsibilities laundry;housekeeping;meal prep;yardwork;shopping   General Information   Onset of Illness/Injury or Date of Surgery 01/17/23   Referring Physician Marisel Moraes MD   Patient/Family Therapy Goal Statement (OT) have a working shoulder   Additional Occupational Profile Info/Pertinent History of Current Problem s/p R TSA reverse   Existing Precautions/Restrictions shoulder   Limitations/Impairments hearing   Left Upper Extremity (Weight-bearing Status) full weight-bearing (FWB)   Right Upper Extremity (Weight-bearing Status) non weight-bearing (NWB)   Left Lower Extremity (Weight-bearing Status) full weight-bearing (FWB)   Right Lower Extremity (Weight-bearing Status) full weight-bearing (FWB)   Cognitive Status Examination    Cognitive Status Comments WFL   Visual Perception   Visual Impairment/Limitations corrective lenses full-time   Impact of Vision Impairment on Function (Vision) WFL   Sensory   Sensory Comments thumb and pointer finger numb,  related to sling   Pain Assessment   Patient Currently in Pain Yes, see Vital Sign flowsheet   Bed Mobility   Bed Mobility No deficits identified   Transfers   Transfers No deficits identified   Balance   Balance Assessment no deficits were identified   Balance Comments standing and walking balance WFL   Bathing Assessment/Intervention   Harper Level (Bathing) minimum assist (75% patient effort)   Upper Body Dressing Assessment/Training   Harper Level (Upper Body Dressing) minimum assist (75% patient effort)   Lower Body Dressing Assessment/Training   Harper Level (Lower Body Dressing) minimum assist (75% patient effort)   Clinical Impression   Criteria for Skilled Therapeutic Interventions Met (OT) Yes, treatment indicated   OT Diagnosis impaired IND for ADLs   OT Problem List-Impairments impacting ADL post-surgical precautions;pain;activity tolerance impaired   Assessment of Occupational Performance 1-3 Performance Deficits   Planned Therapy Interventions (OT) ADL retraining   Clinical Decision Making Complexity (OT) low complexity   Risk & Benefits of therapy have been explained evaluation/treatment results reviewed;care plan/treatment goals reviewed;risks/benefits reviewed;current/potential barriers reviewed;participants voiced agreement with care plan;participants included;patient;spouse/significant other;daughter   Clinical Impression Comments Pt experiencing diminished IND for ADLs 2/2 post-op precautions and pain. Pt would benefit from skilled OT for ADL retraining, including splint management, within precautions.   OT Total Evaluation Time   OT Eval, Low Complexity Minutes (52789) 10   OT Goals   Therapy Frequency (OT) One time eval and treatment   OT Predicted  Duration/Target Date for Goal Attainment 01/18/23   OT Goals Upper Body Dressing;Upper Body Bathing;Bed Mobility   OT: Upper Body Dressing Minimal assist;within precautions;including orthotic;Goal Met   OT: Lower Body Dressing Minimal assist;within precautions;Goal Met   OT: Upper Body Bathing Modified independent;within precautions;Goal Met   OT: Bed Mobility Modified independent;within precautions;Goal Met   Self-Care/Home Management   Self-Care/Home Mgmt/ADL, Compensatory, Meal Prep Minutes (44345) 25   Symptoms Noted During/After Treatment (Meal Preparation/Planning Training) fatigue   Treatment Detail/Skilled Intervention Pt was greeted in room, standing and organizing belongings. Pt and wife educated on precautions and sling management. Verbalized understanding on how to don/doff sling within precautions. Following education and demonstration, pt demonstrated understanding of one-handed dressing techniques with SBA. VC's used frequently to remind pt not to move shoulder. Pt educated on wrist/elbow exercises, demonstrated understanding. Pt ambulated to PT gym IND. Pt educated on hand placement & body positioning for stairs with railing on one side. Pt demonstrated understanding by completing 8 stairs with SBA. Pt ambulated back to room IND. Pt left with all needs met, standing and conversing with wife and daughter.   OT Discharge Planning   OT Plan DC   OT Discharge Recommendation (DC Rec) home with assist   OT Rationale for DC Rec Pt is up and moving in room IND. Pt has support at home from wife, has DME from wife's spinal surgery last year.   OT Brief overview of current status ambulate IND   Total Session Time   Timed Code Treatment Minutes 25   Total Session Time (sum of timed and untimed services) 35

## 2023-01-18 NOTE — PHARMACY-ADMISSION MEDICATION HISTORY
Admission Medication History Completed by Pharmacy    See [facility] Admission Navigator for allergy information, preferred outpatient pharmacy, prior to admission medications and immunization status.     Medication History Sources:     Patient, pharmacy dispense report, PDMP    Changes made to PTA medication list (reason):    Added: vitamin d     Deleted: flonase nasal spray (not using)    Changed: sertraline changed from 150 mg daily to 100 mg daily (prescribed to take up to 150 mg a day but patient has been taking 100 mg which has been working well for him). Simvastatin also changed from 20 mg per day to 10 mg per day (takes a half of a tablet, confirmed with pharmacy dispense records)    Additional Information:    Pt says said doesn't need vit d in hospital    Lorazepam very seldom per patient (likely an old  prescription he keeps on hand since no recent dispenses per PDMP)    Prior to Admission medications    Medication Sig Last Dose Taking? Auth Provider Long Term End Date   LORazepam (ATIVAN) 0.5 MG tablet TAKE 1 TO 2 TABLETS BY MOUTH TWICE DAILY AS NEEDED Past Month Yes Reported, Patient     propranolol ER (INDERAL [place]) 80 MG 24 hr capsule  1/16/2023 at 2100 Yes Reported, Patient Yes    sertraline (ZOLOFT) 100 MG tablet Take 100 mg by mouth daily 1/16/2023 at 2100 Yes Reported, Patient Yes    simvastatin (ZOCOR) 20 MG tablet Take 10 mg by mouth At Bedtime 1/16/2023 at 2100 Yes Reported, Patient Yes    Vitamin D3 (CHOLECALCIFEROL) 25 mcg (1000 units) tablet Take 1 tablet by mouth daily Past Month Yes Unknown, Entered By History     aspirin (ASA) 81 MG chewable tablet Take 81 mg by mouth daily 1/7/2023 yes Reported, Patient     zolpidem (AMBIEN) 10 MG tablet Take 10 mg by mouth nightly as needed for sleep 1/15/2023 yes Reported, Patient         Date completed: 01/17/23    Medication history completed by: Tracey Mcintyre PharmD

## 2023-01-18 NOTE — DISCHARGE SUMMARY
ORTHOPAEDIC SURGERY DISCHARGE SUMMARY     Date of Admission: 1/17/2023  Date of Discharge: 1/18/2023 12:14 PM  Disposition: Home  Staff Physician: No att. providers found  Primary Care Provider: Kervin Ta    DISCHARGE DIAGNOSIS:  Nontraumatic complete tear of right rotator cuff [M75.121]    PROCEDURES: Procedure(s):  Right reverse total shoulder arthroplasty  on 1/17/2023    BRIEF HISTORY:  This is a 67 year old patient who has been followed in clinic. Please refer to that documentation for full details. Briefly, the patient has a history of rotator cuff tear. The patient has failed conservative treatment of symptoms and desires more definitive intervention. Therefore, after reviewing non-operative and operative options including the risks and benefits associated with each, the patient elected to proceed with the above stated procedure.     HOSPITAL COURSE:    The patient was admitted following the above listed procedures for pain control and rehabilitation. Davey Burciaga did well post-operatively. Medicine was consulted post operatively to aid in management of medical co-morbidities. The patient received routine nursing cares and at the time of discharge was medically stable. Vital signs were stable throughout admission. The patient is tolerating a regular diet and is voiding spontaneously. All PT/OT goals have been met for safe mobility. Pain is now controlled on oral medications which will be available on discharge. Stool softeners have been used while taking pain medications to help prevent constipation. Davey Burciaga is deemed medically safe to discharge.     Antibiotics:  Ancef given periop and 24 hours postop.  DVT prophylaxis:  Mechanical  PT Progress:  Has met PT/OT goals for safe mobility.    Pain Meds:  Weaned off all IV pain meds by discharge.  Inpatient Events: No significant events or complications.     PHYSICAL EXAM:    Gen: No acute distress, resting comfortably in bed.  Resp:  Non-labored breathing  Cardio: Regular rate via peripheral pulse  MSK:  RUE:   - Dressings c/d/I, small area of shallow drainage (smaller than a pencil eraser on distal end of dressing)  -Sling in place  - Fires EPL, FPL, Intrinsics  - SILT medial/radial/ulnar/axillary nerves  - Radial pulse 2+, hand wwp    FOLLOWUP:    Follow up with Dr. Mcknight in [place] in 2 weeks postoperatively and Dr. Moraes at 6 weeks postoperatively.    Future Appointments   Date Time Provider Department Center   3/6/2023  3:00 PM Marisel Moraes MD [facility]       Orthopaedic Surgery appointments are at the [facility] ([address] [place] [address]). Call [phone] to schedule a follow-up appointment at this location with your provider.     PLANNED DISCHARGE ORDERS:     DVT Prophylaxis: Mechanical     Activity: NWB RUE in sling     Wound Care: see Below      Discharge Medication List as of 1/18/2023 11:41 AM      START taking these medications    Details   acetaminophen (TYLENOL) 325 MG tablet Take 2 tablets (650 mg) by mouth every 4 hours as needed for other, Disp-60 tablet, R-0, E-Prescribe      aspirin (ASA) 81 MG EC tablet Take 2 tablets (162 mg) by mouth daily, Disp-60 tablet, R-0, E-Prescribe      ibuprofen (ADVIL/MOTRIN) 600 MG tablet Take 1 tablet (600 mg) by mouth every 6 hours as needed for inflammatory pain, Disp-40 tablet, R-0, E-Prescribe      oxyCODONE (ROXICODONE) 5 MG tablet Take 1 tablet (5 mg) by mouth every 4 hours as needed for moderate pain (4-6), Disp-26 tablet, R-0, E-Prescribe      polyethylene glycol (MIRALAX) 17 g packet Take 17 g by mouth daily, Disp-10 packet, R-0, E-Prescribe      senna-docusate (SENOKOT-S/PERICOLACE) 8.6-50 MG tablet Take 1 tablet by mouth 2 times daily, Disp-30 tablet, R-0, E-Prescribe         CONTINUE these medications which have NOT CHANGED    Details   LORazepam (ATIVAN) 0.5 MG tablet TAKE 1 TO 2 TABLETS BY MOUTH TWICE DAILY  AS NEEDED, Historical      propranolol ER (INDERAL [place]) 80 MG 24 hr capsule Historical      sertraline (ZOLOFT) 100 MG tablet Take 100 mg by mouth daily, Historical      simvastatin (ZOCOR) 20 MG tablet Take 10 mg by mouth At Bedtime, Historical      Vitamin D3 (CHOLECALCIFEROL) 25 mcg (1000 units) tablet Take 1 tablet by mouth daily, Historical      zolpidem (AMBIEN) 10 MG tablet Take 10 mg by mouth nightly as needed for sleep, Historical         STOP taking these medications       aspirin (ASA) 81 MG chewable tablet Comments:   Reason for Stopping:                 Discharge Procedure Orders   Reason for your hospital stay   Order Comments: ARTHROPLASTY, RIGHT SHOULDER, TOTAL, REVERSE     Activity   Order Comments: Your activity upon discharge: activity as tolerated    No ROM    Ultra sling at all times except okay to remove with hygeine.     Order Specific Question Answer Comments   Is discharge order? Yes      Adult [facility] Follow-up and recommended labs and tests   Order Comments: Follow up with Dr Moraes as scheduled. Clinic phone number is     Appointments on [place] and/or [facility] (with [facility] or [facility] provider or service). Call [phone] if you haven't heard regarding these appointments within 7 days of discharge.     When to contact your care team   Order Comments: Call Dr Moraes if you have any of the following: temperature greater than 101.3  or less than 96.5,  increased shortness of breath, increased drainage, increased swelling, or increased pain.     Wound care and dressings   Order Comments: Instructions to care for your wound at home: ice to area for comfort, keep wound clean and dry, may get incision wet in shower but do not soak or scrub, reinforce dressing as needed, and remove dressing in 7 days.     Diet   Order Comments: Follow this diet upon discharge: Orders Placed This Encounter      Advance Diet as Tolerated: Regular Diet Adult     Order Specific Question Answer  Comments   Is discharge order? Yes            Rodolfo Rodriguez, DO  Orthopaedic Surgery, PGY1

## 2023-01-18 NOTE — PLAN OF CARE
Patient A/Ox4, relatively pleasant but frustrated with lack of sleep. VSS. Denies CP, SOB, dizziness/LH. LSCTA, has a bit of a cough. +fl/BS. Voiding well in bathroom. CMS intact, ex some residual numbness and tingling in arm. Dressing to shoulder CDI, ice applied. Tolerating regular diet without NV. IS done independently. Activity level is good, pt has walked to bathroom and in halls. IV infusing last antibiotics now.  Pain rated as comfortably managed throughout shift, managed with oxycodone PRN Q4H 5mg so far and scheduled meds. Orthopaedically doing well, pt trying to sleep. Pt did not ask for Ambien at bedtime, but took around 0430. May sleep through the AM, please minimize wakenings if asleep. Rescheduled labs to noon. Possible discharge today. Patient has demonstrated ability to call appropriately. Patient is resting with call light within reach. Will continue to monitor.

## 2023-01-22 NOTE — OP NOTE
"DATE OF PROCEDURE: 1/17/23    PREOPERATIVE DIAGNOSIS:   1. Right cuff tear arthropathy.     POSTOPERATIVE DIAGNOSIS:   1. Right cuff tear arthropathy.     PROCEDURE:   1. Right reverse total shoulder arthroplasty.     STAFF SURGEON: Marisel Moraes MD.   ASSISTANT: Yves Snyder MD; Rodolfo Rodriguez MD    ANESTHESIA: General endotracheal anesthesia.   ESTIMATED BLOOD LOSS: 200 mL.   COMPLICATIONS: None.     IMPLANTS:   1. Biomet Comprehensive Shoulder stem 15x55  2. [employer] Comprehensive Shoulder standard tray and 36mm standard bearing  3. Bi[employer] Comprehensive Shoulder Mini Med Aug (at 10 o'clock posterior) baseplate with 6.5 central screw and 4 peripheral locking screw  4. Bi[employer] Comprehensive Shoulder 36mm Glenosphere with \"C\" offset inferior    BRIEF PATIENT HISTORY: This patient is known to me from clinic where we have discussed the radiographic and physical exam findings. The patient's shoulder has been causing lifestyle limiting pain and he feels that nonoperative management in the form of activity modifications, therapy and injections have not adequately relieved these symptoms. Therefore, the patient wished to consider surgical options. We discussed reverse total shoulder arthroplasty including the risks and benefits and perioperative rehabilitation plan. The patient had the opportunity for questions to be answered and wished to proceed with surgery. Informed consent was completed.     DESCRIPTION OF PROCEDURE: The patient was identified in the preoperative area and the correct right shoulder was marked for surgery. The patient was provided an interscalene block by our anesthesia colleagues. He was taken to the operating room where he was surrendered to general endotracheal anesthesia. He was moved to the operating table in the beachchair position with all bony prominences well padded. The head was placed in a head prajapati with the neck in neutral position. The right upper extremity was prepped and draped in " the usual sterile fashion. A timeout was held in accordance with hospital policy, confirming correct patient, site, side, procedure and administration of IV antibiotics prior to incision. I completed a deltopectoral incision and approach. I identified the cephalic vein and brought this medially. I came underneath the deltoid and freed the subdeltoid adhesions. The subacromial space was obliterated and the humeral head was riding up underneath the acromion. There was significant scar formation from the previous open cuff repair. The biceps had been previously tenotomized and was absent from the joint. I palpated the axillary nerve medially and laterally performing a tug test confirming location and continuity of the nerve. This was performed multiple times throughout the procedure including once prior to closure. I identified the anterior circumflex humeral vessels and ligated and divided these. I then released the capsule off the anterior inferior humerus and dislocated the humerus anteriorly. There was eburnated bone over the humeral head and fragmentation and collapse of the central and superior humeral head. The greater tuberosity was eroded away and was bare. I entered the humeral canal and reamed up to a size 15. I cut the humeral head in 30 degrees of retroversion. I prepared the proximal humerus for a size 15 reverse stem. I then turned my attention to the glenoid. I performed anterior inferior capsular releases and removed the labrum circumferentially. I prepared the glenoid for the superior medium augmented baseplate which was impacted and screws were placed. I then applied the glenosphere and trialed a polyethylene liner. A standard tray and bearing provided forward elevation to 165 degrees with external rotation at the side to 50 without booking anteriorly. There was also internal rotation to the abdomen and contralateral shoulder without impingement. I removed all trial humeral components. I impacted the  stem in the appropriate version. I impacted the final polyethylene liner with the same range of motion as above. I repaired the anterior capsule and remnants of subscapularis to the lesser tuberosity using bone tunnels. I then copiously irrigated the wound. I irrigated the wound then with 15 mL a sterile Betadine and 500 mL of saline. This was then irrigated out with antibiotic saline. The wound was then closed in layered fashion with absorbable suture. Steri-Strips and soft sterile dressing were repaired. The arm was placed into an abduction sling and the patient was extubated and transported to recovery in stable condition. There were no apparent intraoperative complications.     POSTOPERATIVE PLAN:   1. The patient will be admitted to the Orthopedic Service and will begin physical therapy to mobilize out of bed.There will be no shoulder range of motion for 4 weeks but the patient can begin immediate hand, wrist and elbow range of motion. We will use aspirin for deep venous thrombosis prophylaxis.   2. The patient will be seen by me in the clinic for wound check at 2 weeks.     JAIMEE MARTINEZ MD

## 2023-02-06 ENCOUNTER — VIRTUAL VISIT (OUTPATIENT)
Dept: ORTHOPEDICS | Facility: CLINIC | Age: 68
End: 2023-02-06
Payer: COMMERCIAL

## 2023-02-06 DIAGNOSIS — Z96.611 STATUS POST REVERSE TOTAL REPLACEMENT OF RIGHT SHOULDER: Primary | ICD-10-CM

## 2023-02-06 PROCEDURE — 99024 POSTOP FOLLOW-UP VISIT: CPT | Performed by: ORTHOPAEDIC SURGERY

## 2023-02-06 NOTE — NURSING NOTE
Reason For Visit:   Chief Complaint   Patient presents with     Surgical Followup     2 weeks s/p right reverse total shoulder arthroplasty DOS: 1/17/23 - saw someone locally for sutures and looking good!       PCP: Kervin Ta  Ref: Dr. Mcknight     ?  No  Occupation Retired.  Currently working? No.  Work status?  Retired.  Date of injury: Gradual onset  Type of injury: No specific injury.  Date of surgery: 1985 - right rotator cuff repair; 1994 - left rotator cuff repair 2000 - right rotator cuff repair 2004- right rotator cuff repair; 2010 - right shoulder rotator cuff repairs; s/p right reverse total shoulder arthroplasty DOS: 1/17/23  Smoker: No  Request smoking cessation information: No     Right hand dominant       There were no vitals taken for this visit.    Margi Yin, ATC

## 2023-02-06 NOTE — LETTER
2/6/2023         RE: Davey Burciaga  [address] [facility] [address]        Dear Colleague,    Thank you for referring your patient, Davey Burciaga, to the [facility]. Please see a copy of my visit note below.    Davey is a 67 year old who is being evaluated via a billable telephone visit.      What phone number would you like to be contacted at? [phone]  How would you like to obtain your AVS? MyChart    Distant Location (provider location):  On-site  Phone call duration: 9 minutes    CHIEF CONCERN: Status post right reverse total shoulder arthroplasty  DATE OF SURGERY: 1/17/23    HISTORY OF PRESENT ILLNESS: Davey is an extremely pleasant 67 year-old man who is 2 weeks status post the above procedure. He saw his local clinic for wound check and had suture ends trimmed at that time. He is feeling quite well.    IMAGING: None new    ASSESSMENT:  1. Two weeks status post above procedure    PLAN:    Range of Motion: Continue passive and active assisted ROM of the shoulder per operative note.     Sling: Continue sling except for bathing/dressing/rehab    Pain medication: NSAIDs and Tylenol PRN    Follow up: in 4 weeks.  Will discontinue sling at that time and initiate AROM. Plan to advance to strengthening at 3 months          Again, thank you for allowing me to participate in the care of your patient.        Sincerely,        Marisel Moraes MD

## 2023-02-06 NOTE — PROGRESS NOTES
Davey is a 67 year old who is being evaluated via a billable telephone visit.      What phone number would you like to be contacted at? [phone]  How would you like to obtain your AVS? [place]    Distant Location (provider location):  On-site  Phone call duration: 9 minutes    CHIEF CONCERN: Status post right reverse total shoulder arthroplasty  DATE OF SURGERY: 1/17/23    HISTORY OF PRESENT ILLNESS: Davey is an extremely pleasant 67 year-old man who is 2 weeks status post the above procedure. He saw his local clinic for wound check and had suture ends trimmed at that time. He is feeling quite well.    IMAGING: None new    ASSESSMENT:  1. Two weeks status post above procedure    PLAN:    Range of Motion: Continue passive and active assisted ROM of the shoulder per operative note.     Sling: Continue sling except for bathing/dressing/rehab    Pain medication: NSAIDs and Tylenol PRN    Follow up: in 4 weeks.  Will discontinue sling at that time and initiate AROM. Plan to advance to strengthening at 3 months

## 2023-02-12 ENCOUNTER — HEALTH MAINTENANCE LETTER (OUTPATIENT)
Age: 68
End: 2023-02-12

## 2023-03-06 ENCOUNTER — OFFICE VISIT (OUTPATIENT)
Dept: ORTHOPEDICS | Facility: CLINIC | Age: 68
End: 2023-03-06
Payer: COMMERCIAL

## 2023-03-06 DIAGNOSIS — K04.7 DENTAL ABSCESS: ICD-10-CM

## 2023-03-06 DIAGNOSIS — Z96.611 STATUS POST REVERSE TOTAL REPLACEMENT OF RIGHT SHOULDER: Primary | ICD-10-CM

## 2023-03-06 PROCEDURE — 99024 POSTOP FOLLOW-UP VISIT: CPT | Performed by: ORTHOPAEDIC SURGERY

## 2023-03-06 RX ORDER — AMOXICILLIN 500 MG/1
500 CAPSULE ORAL 2 TIMES DAILY
Qty: 28 CAPSULE | Refills: 0 | Status: SHIPPED | OUTPATIENT
Start: 2023-03-06

## 2023-03-06 NOTE — LETTER
3/6/2023         RE: Davey Burciaga  [address] [address]        Dear Colleague,    Thank you for referring your patient, Davey Burciaga, to the [facility]. Please see a copy of my visit note below.    CHIEF CONCERN: Status post right reverse total shoulder arthroplasty  DATE OF SURGERY: 1/17/23    HISTORY OF PRESENT ILLNESS: Davey is an extremely pleasant 67 year-old man who is 2 weeks status post the above procedure. He saw his local clinic for wound check and had suture ends trimmed at that time. He is feeling quite well.    IMAGING: None new    ASSESSMENT:  1. Two weeks status post above procedure    PLAN:    Range of Motion: Continue passive and active assisted ROM of the shoulder per operative note.     Sling: Continue sling except for bathing/dressing/rehab    Pain medication: NSAIDs and Tylenol PRN    Follow up: in 4 weeks.  Will discontinue sling at that time and initiate AROM. Plan to advance to strengthening at 3 months      I have personally examined this patient and have reviewed the clinical presentation and progress note with the resident.  I agree with the treatment plan as outlined.  The plan was formulated with the resident on the day of the resident's note.         Again, thank you for allowing me to participate in the care of your patient.        Sincerely,        Marisel Moraes MD

## 2023-03-06 NOTE — PROGRESS NOTES
CHIEF CONCERN: Status post right reverse total shoulder arthroplasty  DATE OF SURGERY: 1/17/23    HISTORY OF PRESENT ILLNESS: Davey is an extremely pleasant 67 year-old man who is 2 weeks status post the above procedure. He saw his local clinic for wound check and had suture ends trimmed at that time. He is feeling quite well.    IMAGING: None new    ASSESSMENT:  1. Two weeks status post above procedure    PLAN:    Range of Motion: Continue passive and active assisted ROM of the shoulder per operative note.     Sling: Continue sling except for bathing/dressing/rehab    Pain medication: NSAIDs and Tylenol PRN    Follow up: in 4 weeks.  Will discontinue sling at that time and initiate AROM. Plan to advance to strengthening at 3 months      I have personally examined this patient and have reviewed the clinical presentation and progress note with the resident.  I agree with the treatment plan as outlined.  The plan was formulated with the resident on the day of the resident's note.

## 2023-03-06 NOTE — NURSING NOTE
Reason For Visit:   Chief Complaint   Patient presents with     Surgical Followup     6 wks s/p right reverse total shoulder arthroplasty DOS: 1/17/23 - doing great! Numb around pinky and ring finger  Has teeth absess so he's wondering about antibiotics        PCP: Kervin Ta  Ref: Dr. Mcknight     ?  No  Occupation Retired.  Currently working? No.  Work status?  Retired.  Date of injury: Gradual onset  Type of injury: No specific injury.  Date of surgery: 1985 - right rotator cuff repair; 1994 - left rotator cuff repair 2000 - right rotator cuff repair 2004- right rotator cuff repair; 2010 - right shoulder rotator cuff repairs; s/p right reverse total shoulder arthroplasty DOS: 1/17/23  Smoker: No  Request smoking cessation information: No     Right hand dominant    There were no vitals taken for this visit.    Margi Yin, ATC

## 2023-03-27 ENCOUNTER — ANCILLARY PROCEDURE (OUTPATIENT)
Dept: GENERAL RADIOLOGY | Facility: CLINIC | Age: 68
End: 2023-03-27
Attending: ORTHOPAEDIC SURGERY
Payer: COMMERCIAL

## 2023-03-27 ENCOUNTER — ANCILLARY PROCEDURE (OUTPATIENT)
Dept: CT IMAGING | Facility: CLINIC | Age: 68
End: 2023-03-27
Attending: ORTHOPAEDIC SURGERY
Payer: COMMERCIAL

## 2023-03-27 ENCOUNTER — OFFICE VISIT (OUTPATIENT)
Dept: ORTHOPEDICS | Facility: CLINIC | Age: 68
End: 2023-03-27
Payer: COMMERCIAL

## 2023-03-27 DIAGNOSIS — Z96.611 STATUS POST REVERSE TOTAL REPLACEMENT OF RIGHT SHOULDER: ICD-10-CM

## 2023-03-27 DIAGNOSIS — Z96.611 STATUS POST REVERSE TOTAL REPLACEMENT OF RIGHT SHOULDER: Primary | ICD-10-CM

## 2023-03-27 PROCEDURE — 99024 POSTOP FOLLOW-UP VISIT: CPT | Performed by: ORTHOPAEDIC SURGERY

## 2023-03-27 PROCEDURE — 73200 CT UPPER EXTREMITY W/O DYE: CPT | Mod: RT | Performed by: RADIOLOGY

## 2023-03-27 PROCEDURE — 73030 X-RAY EXAM OF SHOULDER: CPT | Mod: RT | Performed by: RADIOLOGY

## 2023-03-27 NOTE — NURSING NOTE
Reason For Visit:   Chief Complaint   Patient presents with     Follow Up     . New right shoulder pain, s/p right reverse total shoulder arthroplasty DOS: 1/17/23     PCP: Kervin Ta  Ref: Dr. Mcknight     ?  No  Occupation Retired.  Currently working? No.  Work status?  Retired.  Date of injury: Gradual onset  Type of injury: No specific injury.  Date of surgery: 1985 - right rotator cuff repair; 1994 - left rotator cuff repair 2000 - right rotator cuff repair 2004- right rotator cuff repair; 2010 - right shoulder rotator cuff repairs; s/p right reverse total shoulder arthroplasty DOS: 1/17/23  Smoker: No  Request smoking cessation information: No    Right hand dominant    SANE score  Affected shoulder: Right  Right shoulder SANE: 30  Left shoulder SANE: 80    There were no vitals taken for this visit.    Jocelynn Figueredo, EMT

## 2023-03-27 NOTE — PROGRESS NOTES
CHIEF CONCERN: Status post right reverse total shoulder arthroplasty  DATE OF SURGERY: 1/17/23    HISTORY OF PRESENT ILLNESS: Davey is an extremely pleasant 67 year-old man who is 2 months status post the above procedure. He was doing quite well until 3/10/23 when he pulled on the lever in his recliner with the right shoulder. He had increased pain at that time and has had ongoing pain.     PHYSICAL EXAM:  Adult male in no acute distress. Articulates and communicates with normal affect.  Respirations even and unlabored  Focused upper extremity exam: Skin intact. No erythema. No deformity. Sens intact over Ax nerve dist. Able to set deltoid though with some discomfort. Tolerates active FE to 70 but with pain. ER at side to 15. No tenderness to palpation over acromion or scapular spine.     IMAGING:   Right shoulder XRs were reviewed and demonstrate reverse components in stable position. No fractures identified.     ASSESSMENT:  1. Two months status post above procedure    PLAN:  We discussed that we will obtain a CT to confirm no evidence for fracture. CT will be obtained as soon as possible. Will relay to patient whether any fracture or change is noted.    Marisel Moraes MD      ADDENDUM:  Reviewed CT. Confirmed no fracture identified. Discussed finding of subacromial fluid which may be postoperative change but could also be secondary to rupture of any residual intact superior cuff.  Encourage gentle ROM. Ice, rest, anti-inflammatory. Monitor for any change/improvement or worsening of symptoms. If residual cuff rupture, expect improvement. Will monitor with RTC no later than 4-6 weeks.

## 2023-03-27 NOTE — LETTER
3/27/2023         RE: Davey Burciaga  [address] [facility] [address]        Dear Colleague,    Thank you for referring your patient, Davey Burciaga, to the [facility]. Please see a copy of my visit note below.    CHIEF CONCERN: Status post right reverse total shoulder arthroplasty  DATE OF SURGERY: 1/17/23    HISTORY OF PRESENT ILLNESS: Davey is an extremely pleasant 67 year-old man who is 2 months status post the above procedure. He was doing quite well until 3/10/23 when he pulled on the lever in his recliner with the right shoulder. He had increased pain at that time and has had ongoing pain.     PHYSICAL EXAM:  Adult male in no acute distress. Articulates and communicates with normal affect.  Respirations even and unlabored  Focused upper extremity exam: Skin intact. No erythema. No deformity. Sens intact over Ax nerve dist. Able to set deltoid though with some discomfort. Tolerates active FE to 70 but with pain. ER at side to 15. No tenderness to palpation over acromion or scapular spine.     IMAGING:   Right shoulder XRs were reviewed and demonstrate reverse components in stable position. No fractures identified.     ASSESSMENT:  1. Two months status post above procedure    PLAN:  We discussed that we will obtain a CT to confirm no evidence for fracture. CT will be obtained as soon as possible. Will relay to patient whether any fracture or change is noted.    Marisel Moraes MD      ADDENDUM:  Reviewed CT. Confirmed no fracture identified. Discussed finding of subacromial fluid which may be postoperative change but could also be secondary to rupture of any residual intact superior cuff.  Encourage gentle ROM. Ice, rest, anti-inflammatory. Monitor for any change/improvement or worsening of symptoms. If residual cuff rupture, expect improvement. Will monitor with RTC no later than 4-6 weeks.      Again, thank you for allowing me to participate in the care of your  patient.        Sincerely,        Marisel Moraes MD

## 2023-04-27 ENCOUNTER — OFFICE VISIT (OUTPATIENT)
Dept: ORTHOPEDICS | Facility: CLINIC | Age: 68
End: 2023-04-27
Payer: COMMERCIAL

## 2023-04-27 DIAGNOSIS — Z96.611 STATUS POST REVERSE TOTAL REPLACEMENT OF RIGHT SHOULDER: Primary | ICD-10-CM

## 2023-04-27 DIAGNOSIS — Z98.890 S/P SHOULDER SURGERY: Primary | ICD-10-CM

## 2023-04-27 PROCEDURE — 99213 OFFICE O/P EST LOW 20 MIN: CPT | Performed by: ORTHOPAEDIC SURGERY

## 2023-04-27 NOTE — LETTER
4/27/2023         RE: Davey Burciaga  [address] [facility] [address]        Dear Colleague,    Thank you for referring your patient, Davey Burciaga, to the [facility]. Please see a copy of my visit note below.    CHIEF CONCERN: Status post right reverse total shoulder arthroplasty  DATE OF SURGERY: 1/17/23    HISTORY OF PRESENT ILLNESS: Davey is an extremely pleasant 67 year-old man who is 3 months status post the above procedure. He reports he has continued to have shoulder pain and he localizes this to the anterior-lateral arm (distal deltoid). Upon more extended discussion with the patient and his wife, she notes that he has been lifting things (case of water and such).    PHYSICAL EXAM:  Adult male in no acute distress. Articulates and communicates with normal affect.  Respirations even and unlabored  Focused upper extremity exam: Skin intact. No erythema. No deformity. Sens intact over Ax nerve dist. Able to set deltoid though with some discomfort. Tolerates active FE to 70 but with pain. ER at side to 15. No tenderness to palpation over acromion or scapular spine.     IMAGING:   None new    ASSESSMENT:  1. Three months status post above procedure    PLAN:  I reviewed the imaging and exam findings. We discussed his pain beginning after his incident pulling up on his recliner lever which has persisted since the event. His CT ruled out fracture about the acromion. Upon discussion of his activity it may be that increased activity is leading to ongoing deltoid irritation. Based on his acute change with his injury and where his pain localizes it does not seem this is referable to notching.   Will coordinate patient referral to and work with  his local PT as well as follow up in 6-8 weeks.      Marisel Moraes MD      Again, thank you for allowing me to participate in the care of your patient.        Sincerely,        Marisel Moraes MD

## 2023-04-27 NOTE — NURSING NOTE
Reason For Visit:   Chief Complaint   Patient presents with     Follow Up     6 weeks (around 4/17/2023).  s/p right reverse total shoulder arthroplasty DOS: 1/17/23     PCP: Kervin Ta  Ref: Dr. Mcknight     ?  No  Occupation Retired.  Currently working? No.  Work status?  Retired.  Date of injury: Gradual onset  Type of injury: No specific injury.  Date of surgery: 1985 - right rotator cuff repair; 1994 - left rotator cuff repair 2000 - right rotator cuff repair 2004- right rotator cuff repair; 2010 - right shoulder rotator cuff repairs; s/p right reverse total shoulder arthroplasty DOS: 1/17/23  Smoker: No  Request smoking cessation information: No     Right hand dominant       SANE score  Affected shoulder: Right  Right shoulder SANE: 30  Left shoulder SANE: 80    There were no vitals taken for this visit.    Jocelynn Figueredo, EMT

## 2023-04-27 NOTE — PROGRESS NOTES
CHIEF CONCERN: Status post right reverse total shoulder arthroplasty  DATE OF SURGERY: 1/17/23    HISTORY OF PRESENT ILLNESS: Davey is an extremely pleasant 67 year-old man who is 3 months status post the above procedure. He reports he has continued to have shoulder pain and he localizes this to the anterior-lateral arm (distal deltoid). Upon more extended discussion with the patient and his wife, she notes that he has been lifting things (case of water and such).    PHYSICAL EXAM:  Adult male in no acute distress. Articulates and communicates with normal affect.  Respirations even and unlabored  Focused upper extremity exam: Skin intact. No erythema. No deformity. Sens intact over Ax nerve dist. Able to set deltoid though with some discomfort. Tolerates active FE to 70 but with pain. ER at side to 15. No tenderness to palpation over acromion or scapular spine.     IMAGING:   None new    ASSESSMENT:  1. Three months status post above procedure    PLAN:  I reviewed the imaging and exam findings. We discussed his pain beginning after his incident pulling up on his recliner lever which has persisted since the event. His CT ruled out fracture about the acromion. Upon discussion of his activity it may be that increased activity is leading to ongoing deltoid irritation. Based on his acute change with his injury and where his pain localizes it does not seem this is referable to notching.   Will coordinate patient referral to and work with  his local PT as well as follow up in 6-8 weeks.      Marisel Moraes MD

## 2023-07-24 ENCOUNTER — OFFICE VISIT (OUTPATIENT)
Dept: ORTHOPEDICS | Facility: CLINIC | Age: 68
End: 2023-07-24
Payer: COMMERCIAL

## 2023-07-24 DIAGNOSIS — Z96.611 STATUS POST REVERSE TOTAL REPLACEMENT OF RIGHT SHOULDER: Primary | ICD-10-CM

## 2023-07-24 PROCEDURE — 99213 OFFICE O/P EST LOW 20 MIN: CPT | Performed by: ORTHOPAEDIC SURGERY

## 2023-07-24 NOTE — LETTER
7/24/2023         RE: Davey Burciaga  [address] [facility] [address]        Dear Colleague,    Thank you for referring your patient, Davey Burciaga, to the [facility]. Please see a copy of my visit note below.    CHIEF CONCERN: Status post right reverse total shoulder arthroplasty  DATE OF SURGERY: 1/17/23    HISTORY OF PRESENT ILLNESS: Davey is an extremely pleasant 67 year-old man who is 6 months status post the above procedure. He has made progress and is pleased with his PT.     PHYSICAL EXAM:  Adult male in no acute distress. Articulates and communicates with normal affect.  Respirations even and unlabored  Focused upper extremity exam:  Sens intact over Ax nerve dist. Able to set deltoid. Active ROM is FE to 135, ER at side to 15 and IR to L5.     IMAGING:   None new    ASSESSMENT:  1. Six months status post above procedure    PLAN  Discussed ongoing PT for strengthening. Reviewed restrictions and limitations of reverse TSA and demand on deltoid.  Return at 1 year or prn.      Marisel Moraes MD    Again, thank you for allowing me to participate in the care of your patient.        Sincerely,        Marisel Moraes MD

## 2023-07-24 NOTE — NURSING NOTE
Reason For Visit:   Chief Complaint   Patient presents with    Surgical Followup     6 months s/p right reverse total shoulder arthroplasty DOS: 1/17/23 - wanting to extend PT. Some pain with sleeping on it.        PCP: Kervin Ta  Ref: Dr. Mcknight     ?  No  Occupation Retired.  Currently working? No.  Work status?  Retired.  Date of injury: Gradual onset  Type of injury: No specific injury.  Date of surgery: 1985 - right rotator cuff repair; 1994 - left rotator cuff repair 2000 - right rotator cuff repair 2004- right rotator cuff repair; 2010 - right shoulder rotator cuff repairs; s/p right reverse total shoulder arthroplasty DOS: 1/17/23  Smoker: No  Request smoking cessation information: No     Right hand dominant        SANE score  Affected shoulder: Right  Right shoulder SANE: 50-60  Left shoulder SANE: 80    There were no vitals taken for this visit.    Margi Yin, ATC

## 2023-07-29 NOTE — PROGRESS NOTES
CHIEF CONCERN: Status post right reverse total shoulder arthroplasty  DATE OF SURGERY: 1/17/23    HISTORY OF PRESENT ILLNESS: Davey is an extremely pleasant 67 year-old man who is 6 months status post the above procedure. He has made progress and is pleased with his PT.     PHYSICAL EXAM:  Adult male in no acute distress. Articulates and communicates with normal affect.  Respirations even and unlabored  Focused upper extremity exam:  Sens intact over Ax nerve dist. Able to set deltoid. Active ROM is FE to 135, ER at side to 15 and IR to L5.     IMAGING:   None new    ASSESSMENT:  1. Six months status post above procedure    PLAN  Discussed ongoing PT for strengthening. Reviewed restrictions and limitations of reverse TSA and demand on deltoid.  Return at 1 year or prn.      Marisel Moraes MD

## 2024-03-10 ENCOUNTER — HEALTH MAINTENANCE LETTER (OUTPATIENT)
Age: 69
End: 2024-03-10

## 2025-03-16 ENCOUNTER — HEALTH MAINTENANCE LETTER (OUTPATIENT)
Age: 70
End: 2025-03-16

## (undated) DEVICE — SOL NACL 0.9% IRRIG 1000ML BOTTLE 2F7124

## (undated) DEVICE — SU ETHIBOND 2 V-37 4X30" MX69G

## (undated) DEVICE — LINEN TOWEL PACK X5 5464

## (undated) DEVICE — ESU PENCIL W/SMOKE EVAC NEPTUNE STRYKER 0703-046-000

## (undated) DEVICE — IMM KIT SHOULDER TMAX MASK FACE 7210559

## (undated) DEVICE — STRAP KNEE/BODY 31143004

## (undated) DEVICE — BONE CLEANING TIP INTERPULSE  0210-010-000

## (undated) DEVICE — Device

## (undated) DEVICE — ESU GROUND PAD ADULT W/CORD E7507

## (undated) DEVICE — ESU ELEC NDL 1" E1552

## (undated) DEVICE — DRSG STERI STRIP 1/2X4" R1547

## (undated) DEVICE — POSITIONER ARMBOARD FOAM 1PAIR LF FP-ARMB1

## (undated) DEVICE — SOL NACL 0.9% INJ 1000ML BAG 2B1324X

## (undated) DEVICE — DEVICE RETRIEVER HEWSON 71111579

## (undated) DEVICE — BLADE SAW SAGITTAL STRK 18X90X0.89MM  6118-089-090

## (undated) DEVICE — SU MONOCRYL 2-0 SH 27" UND Y417H

## (undated) DEVICE — SOL HYDROGEN PEROXIDE 3% 4OZ BOTTLE F0010

## (undated) DEVICE — DRILL WITH STOP

## (undated) DEVICE — SPONGE LAP 18X18" X8435

## (undated) DEVICE — GLOVE BIOGEL PI SZ 7.5 40875

## (undated) DEVICE — DRSG AQUACEL AG HYDROFIBER  3.5X10" 422605

## (undated) DEVICE — PREP DURAPREP 26ML APL 8630

## (undated) DEVICE — RESTRAINT LIMB HOLDER ANKLE/WRIST FOAM W/QUICK RELEASE 2533

## (undated) DEVICE — COVER CAMERA IN-LIGHT DISP LT-C02

## (undated) DEVICE — DRAPE U-POUCH 34X29" 1067

## (undated) DEVICE — LINEN ORTHO PACK 5446

## (undated) DEVICE — DRAPE POUCH INSTRUMENT 1018

## (undated) DEVICE — SYR BULB IRRIG DOVER 60 ML LATEX FREE 67000

## (undated) DEVICE — IMM KIT SHOULDER STABILIZATION 7210573

## (undated) DEVICE — ESU CLEANER TIP 31142717

## (undated) DEVICE — BRUSH SURGICAL SCRUB W/PCMX 4457A

## (undated) DEVICE — SUCTION IRR SYSTEM W/O TIP INTERPULSE HANDPIECE 0210-100-000

## (undated) DEVICE — SU SILK 2-0 TIE 12X30" A305H

## (undated) DEVICE — SU ETHIBOND 0 CT-1 CR 8X18" CX21D

## (undated) DEVICE — GLOVE BIOGEL PI MICRO SZ 7.5 48575

## (undated) DEVICE — DRAPE IOBAN INCISE 23X17" 6650EZ

## (undated) DEVICE — BIT DRILL BIOMET PERIPHERAL SCR 2.7MM SS 405889

## (undated) DEVICE — LIGHT HANDLE X1 31140133

## (undated) DEVICE — PACK SET-UP STD 9102

## (undated) DEVICE — SU MONOCRYL 3-0 PS-1 27" Y936H

## (undated) DEVICE — DRAPE U-DRAPE 1015NSD NON-STERILE

## (undated) DEVICE — SUCTION MANIFOLD NEPTUNE 2 SYS 4 PORT 0702-020-000

## (undated) DEVICE — SCREW GUIDE AUGMENT REAM 110040300

## (undated) DEVICE — EYE PREP BETADINE 5% SOLUTION 30ML 0065-0411-30

## (undated) DEVICE — SOL WATER IRRIG 1000ML BOTTLE 2F7114

## (undated) DEVICE — THREADED TIP STEINMANN PIN

## (undated) DEVICE — BIT DRILL BIOMET CENTRAL SCR 3.2MM SS 405883

## (undated) DEVICE — SU FIBERWIRE 2 38"  AR-7200

## (undated) RX ORDER — FENTANYL CITRATE 50 UG/ML
INJECTION, SOLUTION INTRAMUSCULAR; INTRAVENOUS
Status: DISPENSED
Start: 2023-01-17

## (undated) RX ORDER — ACETAMINOPHEN 325 MG/1
TABLET ORAL
Status: DISPENSED
Start: 2023-01-17

## (undated) RX ORDER — CEFAZOLIN SODIUM/WATER 2 G/20 ML
SYRINGE (ML) INTRAVENOUS
Status: DISPENSED
Start: 2023-01-17

## (undated) RX ORDER — EPHEDRINE SULFATE 50 MG/ML
INJECTION, SOLUTION INTRAMUSCULAR; INTRAVENOUS; SUBCUTANEOUS
Status: DISPENSED
Start: 2023-01-17

## (undated) RX ORDER — TRANEXAMIC ACID 650 MG/1
TABLET ORAL
Status: DISPENSED
Start: 2023-01-17

## (undated) RX ORDER — VANCOMYCIN HYDROCHLORIDE 1 G/20ML
INJECTION, POWDER, LYOPHILIZED, FOR SOLUTION INTRAVENOUS
Status: DISPENSED
Start: 2023-01-17